# Patient Record
Sex: MALE | Race: WHITE | Employment: OTHER | URBAN - METROPOLITAN AREA
[De-identification: names, ages, dates, MRNs, and addresses within clinical notes are randomized per-mention and may not be internally consistent; named-entity substitution may affect disease eponyms.]

---

## 2017-01-30 ENCOUNTER — HOSPITAL ENCOUNTER (OUTPATIENT)
Dept: GENERAL RADIOLOGY | Age: 67
Discharge: HOME | End: 2017-01-30
Attending: FAMILY MEDICINE
Payer: MEDICARE

## 2017-01-30 DIAGNOSIS — R06.09 DYSPNEA ON EXERTION: ICD-10-CM

## 2017-01-30 PROCEDURE — 71020 XR CHEST PA LAT: CPT

## 2017-01-30 NOTE — PROGRESS NOTES
The chest x ray shows that the heart is a little large likely from the high blood pressure. I will refer to cardiologist for evaluation.  He may need echo and /or stress test.     Alexis Ponce MD

## 2017-02-02 PROBLEM — R06.09 DYSPNEA ON EXERTION: Status: ACTIVE | Noted: 2017-02-02

## 2017-02-02 PROBLEM — E78.2 MIXED HYPERLIPIDEMIA: Status: ACTIVE | Noted: 2017-02-02

## 2017-02-02 PROBLEM — I51.7 CARDIOMEGALY: Status: ACTIVE | Noted: 2017-02-02

## 2017-02-02 PROBLEM — R94.31 ABNORMAL EKG: Status: ACTIVE | Noted: 2017-02-02

## 2017-02-02 PROBLEM — I49.3 VENTRICULAR BEAT, PREMATURE: Status: ACTIVE | Noted: 2017-02-02

## 2017-05-11 ENCOUNTER — HOSPITAL ENCOUNTER (OUTPATIENT)
Dept: MAMMOGRAPHY | Age: 67
Discharge: HOME | End: 2017-05-11
Attending: FAMILY MEDICINE
Payer: MEDICARE

## 2017-05-11 DIAGNOSIS — R22.31 AXILLARY MASS, RIGHT: ICD-10-CM

## 2017-05-11 PROCEDURE — 76882 US LMTD JT/FCL EVL NVASC XTR: CPT

## 2017-05-18 ENCOUNTER — HOSPITAL ENCOUNTER (OUTPATIENT)
Dept: MAMMOGRAPHY | Age: 67
Discharge: HOME | End: 2017-05-18
Attending: FAMILY MEDICINE
Payer: MEDICARE

## 2017-05-18 VITALS — TEMPERATURE: 97.8 F | HEART RATE: 75 BPM | SYSTOLIC BLOOD PRESSURE: 138 MMHG | DIASTOLIC BLOOD PRESSURE: 89 MMHG

## 2017-05-18 DIAGNOSIS — R22.31 MASS OF RIGHT AXILLA: ICD-10-CM

## 2017-05-18 PROCEDURE — 88305 TISSUE EXAM BY PATHOLOGIST: CPT | Performed by: FAMILY MEDICINE

## 2017-05-18 PROCEDURE — 38505 NEEDLE BIOPSY LYMPH NODES: CPT

## 2017-05-18 PROCEDURE — 74011000250 HC RX REV CODE- 250: Performed by: FAMILY MEDICINE

## 2017-05-18 PROCEDURE — 19083 BX BREAST 1ST LESION US IMAG: CPT

## 2017-05-18 RX ORDER — LIDOCAINE HYDROCHLORIDE 10 MG/ML
4 INJECTION INFILTRATION; PERINEURAL
Status: COMPLETED | OUTPATIENT
Start: 2017-05-18 | End: 2017-05-18

## 2017-05-18 RX ADMIN — LIDOCAINE HYDROCHLORIDE 4 ML: 10 INJECTION, SOLUTION INFILTRATION; PERINEURAL at 09:47

## 2017-05-19 NOTE — PROGRESS NOTES
Spoke w/ patient and he voiced understanding. He is unsure of what these lab results mean but he has an appointment w/ you on Monday so he will speak w/ you then.  He states at this moment in time he is not in the mood for surgery

## 2017-05-19 NOTE — PROGRESS NOTES
Biopsy was not conclusive. Apparently sample was not representative of the whole mass. I think he should see surgeon to have it removed. I can set up him up if he gives me the green light.     Chava Nelson MD

## 2017-06-07 ENCOUNTER — HOSPITAL ENCOUNTER (OUTPATIENT)
Dept: CT IMAGING | Age: 67
Discharge: HOME | End: 2017-06-07
Attending: SURGERY
Payer: MEDICARE

## 2017-06-07 DIAGNOSIS — R22.31 AXILLARY MASS, RIGHT: ICD-10-CM

## 2017-06-07 DIAGNOSIS — R22.2 MASS OF CHEST WALL, RIGHT: ICD-10-CM

## 2017-06-07 PROCEDURE — 74011000258 HC RX REV CODE- 258: Performed by: SURGERY

## 2017-06-07 PROCEDURE — 71260 CT THORAX DX C+: CPT

## 2017-06-07 PROCEDURE — 74011636320 HC RX REV CODE- 636/320: Performed by: SURGERY

## 2017-06-07 RX ORDER — SODIUM CHLORIDE 0.9 % (FLUSH) 0.9 %
10 SYRINGE (ML) INJECTION
Status: COMPLETED | OUTPATIENT
Start: 2017-06-07 | End: 2017-06-07

## 2017-06-07 RX ADMIN — Medication 10 ML: at 08:37

## 2017-06-07 RX ADMIN — IOPAMIDOL 80 ML: 755 INJECTION, SOLUTION INTRAVENOUS at 08:36

## 2017-06-07 RX ADMIN — SODIUM CHLORIDE 100 ML: 900 INJECTION, SOLUTION INTRAVENOUS at 08:36

## 2017-06-09 NOTE — PROGRESS NOTES
Kanika Blackwood got a CT because of the size of his axillary mass and it is benign. I called the patient and left a message so he can arrange for removal of his benign lipoma. I also mentioned his hiatal hernia and we could discuss that at a office visit to schedule the lipoma removal.  Thank you for sending him. Navid Oates.

## 2017-07-17 PROBLEM — E66.9 OBESITY (BMI 30-39.9): Status: ACTIVE | Noted: 2017-07-17

## 2017-07-17 PROBLEM — R29.818 SUSPECTED SLEEP APNEA: Status: ACTIVE | Noted: 2017-07-17

## 2017-07-31 ENCOUNTER — HOSPITAL ENCOUNTER (OUTPATIENT)
Dept: SLEEP MEDICINE | Age: 67
Discharge: HOME | End: 2017-07-31
Attending: FAMILY MEDICINE
Payer: MEDICARE

## 2017-07-31 PROCEDURE — 95810 POLYSOM 6/> YRS 4/> PARAM: CPT

## 2017-08-28 ENCOUNTER — HOSPITAL ENCOUNTER (OUTPATIENT)
Dept: SLEEP MEDICINE | Age: 67
Discharge: HOME | End: 2017-08-28
Attending: INTERNAL MEDICINE
Payer: MEDICARE

## 2017-08-28 PROCEDURE — 95811 POLYSOM 6/>YRS CPAP 4/> PARM: CPT

## 2017-11-30 PROBLEM — G47.33 OSA (OBSTRUCTIVE SLEEP APNEA): Status: ACTIVE | Noted: 2017-07-17

## 2018-01-02 PROBLEM — R09.02 HYPOXEMIA: Status: ACTIVE | Noted: 2018-01-02

## 2018-02-01 ENCOUNTER — APPOINTMENT (RX ONLY)
Dept: URBAN - METROPOLITAN AREA CLINIC 349 | Facility: CLINIC | Age: 68
Setting detail: DERMATOLOGY
End: 2018-02-01

## 2018-02-01 DIAGNOSIS — L82.0 INFLAMED SEBORRHEIC KERATOSIS: ICD-10-CM

## 2018-02-01 DIAGNOSIS — L72.0 EPIDERMAL CYST: ICD-10-CM

## 2018-02-01 DIAGNOSIS — D22 MELANOCYTIC NEVI: ICD-10-CM

## 2018-02-01 DIAGNOSIS — L73.8 OTHER SPECIFIED FOLLICULAR DISORDERS: ICD-10-CM

## 2018-02-01 DIAGNOSIS — D18.0 HEMANGIOMA: ICD-10-CM

## 2018-02-01 DIAGNOSIS — B07.8 OTHER VIRAL WARTS: ICD-10-CM

## 2018-02-01 DIAGNOSIS — L82.1 OTHER SEBORRHEIC KERATOSIS: ICD-10-CM

## 2018-02-01 PROBLEM — D23.61 OTHER BENIGN NEOPLASM OF SKIN OF RIGHT UPPER LIMB, INCLUDING SHOULDER: Status: ACTIVE | Noted: 2018-02-01

## 2018-02-01 PROBLEM — D22.61 MELANOCYTIC NEVI OF RIGHT UPPER LIMB, INCLUDING SHOULDER: Status: ACTIVE | Noted: 2018-02-01

## 2018-02-01 PROBLEM — D18.01 HEMANGIOMA OF SKIN AND SUBCUTANEOUS TISSUE: Status: ACTIVE | Noted: 2018-02-01

## 2018-02-01 PROCEDURE — ? LIQUID NITROGEN

## 2018-02-01 PROCEDURE — ? PATHOLOGY BILLING

## 2018-02-01 PROCEDURE — 99202 OFFICE O/P NEW SF 15 MIN: CPT | Mod: 25

## 2018-02-01 PROCEDURE — 17110 DESTRUCTION B9 LES UP TO 14: CPT

## 2018-02-01 PROCEDURE — 88305 TISSUE EXAM BY PATHOLOGIST: CPT

## 2018-02-01 PROCEDURE — ? SHAVE REMOVAL

## 2018-02-01 PROCEDURE — 11306 SHAVE SKIN LESION 0.6-1.0 CM: CPT | Mod: 59

## 2018-02-01 PROCEDURE — ? COUNSELING

## 2018-02-01 PROCEDURE — A4550 SURGICAL TRAYS: HCPCS

## 2018-02-01 ASSESSMENT — LOCATION SIMPLE DESCRIPTION DERM
LOCATION SIMPLE: RIGHT LOWER BACK
LOCATION SIMPLE: LEFT UPPER BACK
LOCATION SIMPLE: LEFT UPPER ARM
LOCATION SIMPLE: LEFT LOWER BACK
LOCATION SIMPLE: RIGHT EYEBROW
LOCATION SIMPLE: ABDOMEN
LOCATION SIMPLE: LEFT SHOULDER
LOCATION SIMPLE: CHEST
LOCATION SIMPLE: LEFT FOREHEAD
LOCATION SIMPLE: RIGHT CHEEK
LOCATION SIMPLE: RIGHT FOREHEAD
LOCATION SIMPLE: RIGHT UPPER ARM
LOCATION SIMPLE: RIGHT SHOULDER
LOCATION SIMPLE: RIGHT UPPER BACK
LOCATION SIMPLE: LEFT CHEEK
LOCATION SIMPLE: RIGHT MIDDLE FINGER

## 2018-02-01 ASSESSMENT — LOCATION DETAILED DESCRIPTION DERM
LOCATION DETAILED: LEFT PROXIMAL POSTERIOR UPPER ARM
LOCATION DETAILED: LEFT INFERIOR LATERAL MIDBACK
LOCATION DETAILED: LEFT LATERAL UPPER BACK
LOCATION DETAILED: RIGHT LATERAL SUPERIOR CHEST
LOCATION DETAILED: RIGHT SUPERIOR MEDIAL MALAR CHEEK
LOCATION DETAILED: LEFT FOREHEAD
LOCATION DETAILED: RIGHT LATERAL ABDOMEN
LOCATION DETAILED: RIGHT ANTERIOR DISTAL UPPER ARM
LOCATION DETAILED: RIGHT INFERIOR LATERAL MIDBACK
LOCATION DETAILED: RIGHT CENTRAL MALAR CHEEK
LOCATION DETAILED: EPIGASTRIC SKIN
LOCATION DETAILED: RIGHT INFERIOR LATERAL FOREHEAD
LOCATION DETAILED: LEFT POSTERIOR SHOULDER
LOCATION DETAILED: LEFT SUPERIOR UPPER BACK
LOCATION DETAILED: RIGHT POSTERIOR SHOULDER
LOCATION DETAILED: RIGHT DISTAL PALMAR MIDDLE FINGER
LOCATION DETAILED: RIGHT INFERIOR MEDIAL FOREHEAD
LOCATION DETAILED: RIGHT SUPERIOR LATERAL BUCCAL CHEEK
LOCATION DETAILED: RIGHT LATERAL EYEBROW
LOCATION DETAILED: LEFT SUPERIOR LATERAL BUCCAL CHEEK
LOCATION DETAILED: RIGHT LATERAL UPPER BACK

## 2018-02-01 ASSESSMENT — LOCATION ZONE DERM
LOCATION ZONE: TRUNK
LOCATION ZONE: ARM
LOCATION ZONE: FACE
LOCATION ZONE: FINGER

## 2018-02-01 NOTE — PROCEDURE: LIQUID NITROGEN
Render Post-Care Instructions In Note?: yes
Number Of Freeze-Thaw Cycles: 2 freeze-thaw cycles
Detail Level: Detailed
Consent: The patient's consent was obtained including but not limited to risks of crusting, scabbing, blistering, scarring, darker or lighter pigmentary change, recurrence, incomplete removal and infection.
Add 52 Modifier (Optional): no
Medical Necessity Clause: This procedure was medically necessary because the lesions that were treated were:
Medical Necessity Information: It is in your best interest to select a reason for this procedure from the list below. All of these items fulfill various CMS LCD requirements except the new and changing color options.
Post-Care Instructions: I reviewed with the patient in detail post-care instructions. Patient is to wear sunprotection, and avoid picking at any of the treated lesions. Pt may apply Vaseline to crusted or scabbing areas.

## 2018-02-01 NOTE — PROCEDURE: SHAVE REMOVAL
X Size Of Lesion In Cm (Optional): 0
Detail Level: Detailed
Wound Care: Vaseline
Medical Necessity Information: It is in your best interest to select a reason for this procedure from the list below. All of these items fulfill various CMS LCD requirements except the new and changing color options.
Hemostasis: Electrocautery
Consent was obtained from the patient. The risks and benefits to therapy were discussed in detail. Specifically, the risks of infection, scarring, bleeding, prolonged wound healing, incomplete removal, allergy to anesthesia, nerve injury and recurrence were addressed. Prior to the procedure, the treatment site was clearly identified and confirmed by the patient. All components of Universal Protocol/PAUSE Rule completed.
Accession #: MD [id number]
Notification Instructions: Patient will be notified of biopsy results. However, patient instructed to call the office if not contacted within 2 weeks.
Billing Type: Third-Party Bill
Size Of Lesion In Cm (Required): 0.8
Render Post-Care Instructions In Note?: yes
Bill [id number] For Specimen Handling/Conveyance To Laboratory?: no
Anesthesia Type: 1% lidocaine with epinephrine
Anesthesia Volume In Cc: 0.3
Post-Care Instructions: I reviewed with the patient in detail post-care instructions. Patient is to keep the biopsy site dry overnight, and then apply bacitracin twice daily until healed. Patient may apply hydrogen peroxide soaks to remove any crusting.  After the procedure, the patient was observed for 5-10 minutes and was oriented to,person, place and time and denied feeling dizzy, queasy and stated that they were not going to faint
Path Notes (To The Dermatopathologist): Please check margins.
Biopsy Method: Dermablade
Medical Necessity Clause: This procedure was medically necessary because the lesion that was treated was:enlarging.

## 2018-08-14 ENCOUNTER — APPOINTMENT (RX ONLY)
Dept: URBAN - METROPOLITAN AREA CLINIC 349 | Facility: CLINIC | Age: 68
Setting detail: DERMATOLOGY
End: 2018-08-14

## 2018-08-14 DIAGNOSIS — L91.8 OTHER HYPERTROPHIC DISORDERS OF THE SKIN: ICD-10-CM

## 2018-08-14 DIAGNOSIS — L82.1 OTHER SEBORRHEIC KERATOSIS: ICD-10-CM

## 2018-08-14 DIAGNOSIS — D18.0 HEMANGIOMA: ICD-10-CM

## 2018-08-14 DIAGNOSIS — D22 MELANOCYTIC NEVI: ICD-10-CM

## 2018-08-14 PROBLEM — D22.5 MELANOCYTIC NEVI OF TRUNK: Status: ACTIVE | Noted: 2018-08-14

## 2018-08-14 PROBLEM — L20.84 INTRINSIC (ALLERGIC) ECZEMA: Status: ACTIVE | Noted: 2018-08-14

## 2018-08-14 PROBLEM — D18.01 HEMANGIOMA OF SKIN AND SUBCUTANEOUS TISSUE: Status: ACTIVE | Noted: 2018-08-14

## 2018-08-14 PROBLEM — L85.3 XEROSIS CUTIS: Status: ACTIVE | Noted: 2018-08-14

## 2018-08-14 PROBLEM — D22.61 MELANOCYTIC NEVI OF RIGHT UPPER LIMB, INCLUDING SHOULDER: Status: ACTIVE | Noted: 2018-08-14

## 2018-08-14 PROCEDURE — 11200 RMVL SKIN TAGS UP TO&INC 15: CPT

## 2018-08-14 PROCEDURE — 99213 OFFICE O/P EST LOW 20 MIN: CPT | Mod: 25

## 2018-08-14 PROCEDURE — ? COUNSELING

## 2018-08-14 PROCEDURE — ? SKIN TAG REMOVAL MULTI

## 2018-08-14 ASSESSMENT — LOCATION SIMPLE DESCRIPTION DERM
LOCATION SIMPLE: LEFT UPPER BACK
LOCATION SIMPLE: LEFT POSTERIOR AXILLA
LOCATION SIMPLE: LEFT ANTERIOR NECK
LOCATION SIMPLE: RIGHT SHOULDER
LOCATION SIMPLE: LEFT AXILLARY VAULT
LOCATION SIMPLE: RIGHT AXILLARY VAULT
LOCATION SIMPLE: RIGHT ANTERIOR NECK
LOCATION SIMPLE: RIGHT CHEEK
LOCATION SIMPLE: CHEST
LOCATION SIMPLE: RIGHT UPPER BACK
LOCATION SIMPLE: LEFT CLAVICULAR SKIN
LOCATION SIMPLE: GROIN
LOCATION SIMPLE: RIGHT FOREARM
LOCATION SIMPLE: RIGHT UPPER ARM
LOCATION SIMPLE: POSTERIOR NECK
LOCATION SIMPLE: ABDOMEN

## 2018-08-14 ASSESSMENT — LOCATION DETAILED DESCRIPTION DERM
LOCATION DETAILED: LEFT POSTERIOR AXILLA
LOCATION DETAILED: LEFT SUPERIOR UPPER BACK
LOCATION DETAILED: RIGHT POSTERIOR NECK
LOCATION DETAILED: LEFT AXILLARY VAULT
LOCATION DETAILED: RIGHT RIB CAGE
LOCATION DETAILED: LEFT INFERIOR UPPER BACK
LOCATION DETAILED: RIGHT VENTRAL PROXIMAL FOREARM
LOCATION DETAILED: LEFT CLAVICULAR SKIN
LOCATION DETAILED: RIGHT CLAVICULAR NECK
LOCATION DETAILED: LEFT INFERIOR LATERAL NECK
LOCATION DETAILED: RIGHT SUPERIOR UPPER BACK
LOCATION DETAILED: RIGHT ANTERIOR SHOULDER
LOCATION DETAILED: RIGHT ANTERIOR PROXIMAL UPPER ARM
LOCATION DETAILED: RIGHT PROXIMAL POSTERIOR UPPER ARM
LOCATION DETAILED: RIGHT LATERAL SUPERIOR CHEST
LOCATION DETAILED: LEFT SUPERIOR ANTERIOR NECK
LOCATION DETAILED: RIGHT CENTRAL MALAR CHEEK
LOCATION DETAILED: LEFT INGUINAL CREASE
LOCATION DETAILED: RIGHT INFERIOR ANTERIOR NECK
LOCATION DETAILED: LEFT CLAVICULAR NECK
LOCATION DETAILED: RIGHT AXILLARY VAULT
LOCATION DETAILED: RIGHT SUPERIOR ANTERIOR NECK
LOCATION DETAILED: RIGHT INFERIOR LATERAL NECK
LOCATION DETAILED: LEFT INFERIOR POSTERIOR NECK
LOCATION DETAILED: LEFT LATERAL SUPERIOR CHEST
LOCATION DETAILED: LEFT SUPERIOR LATERAL UPPER BACK
LOCATION DETAILED: LEFT INFERIOR LATERAL UPPER BACK

## 2018-08-14 ASSESSMENT — LOCATION ZONE DERM
LOCATION ZONE: ARM
LOCATION ZONE: AXILLAE
LOCATION ZONE: TRUNK
LOCATION ZONE: NECK
LOCATION ZONE: FACE

## 2018-08-14 NOTE — PROCEDURE: SKIN TAG REMOVAL MULTI
Consent: Written consent obtained and the risks of skin tag removal was reviewed with the patient including but not limited to bleeding, pigmentary change, infection, pain, and remote possibility of scarring.
Medical Necessity Clause: This procedure was medically necessary because the lesions that were treated were: rubbed by clothing.
Medical Necessity Information: It is in your best interest to select a reason for this procedure from the list below. All of these items fulfill various CMS LCD requirements except the new and changing color options.
Total Number Of Lesions Treated: 1
Detail Level: Detailed
Include Z78.9 (Other Specified Conditions Influencing Health Status) As An Associated Diagnosis?: Yes
Anesthesia Volume In Cc: 0
Anesthesia Type: 2% lidocaine without epinephrine

## 2018-10-23 ENCOUNTER — APPOINTMENT (RX ONLY)
Dept: URBAN - METROPOLITAN AREA CLINIC 349 | Facility: CLINIC | Age: 68
Setting detail: DERMATOLOGY
End: 2018-10-23

## 2018-10-23 DIAGNOSIS — L57.8 OTHER SKIN CHANGES DUE TO CHRONIC EXPOSURE TO NONIONIZING RADIATION: ICD-10-CM

## 2018-10-23 DIAGNOSIS — D18.0 HEMANGIOMA: ICD-10-CM

## 2018-10-23 DIAGNOSIS — L82.0 INFLAMED SEBORRHEIC KERATOSIS: ICD-10-CM

## 2018-10-23 DIAGNOSIS — L72.0 EPIDERMAL CYST: ICD-10-CM

## 2018-10-23 DIAGNOSIS — L82.1 OTHER SEBORRHEIC KERATOSIS: ICD-10-CM

## 2018-10-23 PROBLEM — D18.01 HEMANGIOMA OF SKIN AND SUBCUTANEOUS TISSUE: Status: ACTIVE | Noted: 2018-10-23

## 2018-10-23 PROBLEM — K21.9 GASTRO-ESOPHAGEAL REFLUX DISEASE WITHOUT ESOPHAGITIS: Status: ACTIVE | Noted: 2018-10-23

## 2018-10-23 PROCEDURE — ? LIQUID NITROGEN

## 2018-10-23 PROCEDURE — 99213 OFFICE O/P EST LOW 20 MIN: CPT | Mod: 25

## 2018-10-23 PROCEDURE — 17110 DESTRUCTION B9 LES UP TO 14: CPT

## 2018-10-23 PROCEDURE — ? COUNSELING

## 2018-10-23 ASSESSMENT — LOCATION SIMPLE DESCRIPTION DERM
LOCATION SIMPLE: RIGHT HAND
LOCATION SIMPLE: RIGHT TEMPLE
LOCATION SIMPLE: LEFT FOREARM
LOCATION SIMPLE: ABDOMEN
LOCATION SIMPLE: RIGHT LIP
LOCATION SIMPLE: RIGHT CHEEK
LOCATION SIMPLE: CHEST
LOCATION SIMPLE: RIGHT FOREARM
LOCATION SIMPLE: LEFT HAND
LOCATION SIMPLE: LEFT EYEBROW
LOCATION SIMPLE: INFERIOR FOREHEAD
LOCATION SIMPLE: LEFT CHEEK
LOCATION SIMPLE: LEFT UPPER BACK
LOCATION SIMPLE: LEFT LOWER BACK
LOCATION SIMPLE: RIGHT UPPER BACK

## 2018-10-23 ASSESSMENT — LOCATION ZONE DERM
LOCATION ZONE: HAND
LOCATION ZONE: FACE
LOCATION ZONE: LIP
LOCATION ZONE: ARM
LOCATION ZONE: TRUNK

## 2018-10-23 ASSESSMENT — LOCATION DETAILED DESCRIPTION DERM
LOCATION DETAILED: LEFT SUPERIOR LATERAL UPPER BACK
LOCATION DETAILED: RIGHT LOWER CUTANEOUS LIP
LOCATION DETAILED: INFERIOR MID FOREHEAD
LOCATION DETAILED: LEFT SUPERIOR LATERAL MALAR CHEEK
LOCATION DETAILED: RIGHT CENTRAL MALAR CHEEK
LOCATION DETAILED: STERNUM
LOCATION DETAILED: LEFT LATERAL EYEBROW
LOCATION DETAILED: LEFT INFERIOR MEDIAL UPPER BACK
LOCATION DETAILED: LEFT ULNAR DORSAL HAND
LOCATION DETAILED: LEFT PROXIMAL DORSAL FOREARM
LOCATION DETAILED: LEFT CENTRAL EYEBROW
LOCATION DETAILED: EPIGASTRIC SKIN
LOCATION DETAILED: RIGHT SUPERIOR UPPER BACK
LOCATION DETAILED: LEFT SUPERIOR MEDIAL MIDBACK
LOCATION DETAILED: RIGHT RADIAL DORSAL HAND
LOCATION DETAILED: RIGHT MID TEMPLE
LOCATION DETAILED: LEFT CENTRAL MALAR CHEEK
LOCATION DETAILED: RIGHT PROXIMAL DORSAL FOREARM
LOCATION DETAILED: RIGHT SUPERIOR MEDIAL UPPER BACK

## 2018-10-23 NOTE — PROCEDURE: LIQUID NITROGEN
Medical Necessity Information: It is in your best interest to select a reason for this procedure from the list below. All of these items fulfill various CMS LCD requirements except the new and changing color options.
Medical Necessity Clause: This procedure was medically necessary because the lesions that were treated were:
Include Z78.9 (Other Specified Conditions Influencing Health Status) As An Associated Diagnosis?: Yes
Number Of Freeze-Thaw Cycles: 2 freeze-thaw cycles
Pared With?: 15 blade
Post-Care Instructions: I reviewed with the patient in detail post-care instructions. Patient is to wear sunprotection, and avoid picking at any of the treated lesions. Pt may apply Vaseline to crusted or scabbing areas.
Add 52 Modifier (Optional): no
Consent: The patient's consent was obtained including but not limited to risks of crusting, scabbing, blistering, scarring, darker or lighter pigmentary change, recurrence, incomplete removal and infection.
Detail Level: Detailed

## 2018-12-18 ENCOUNTER — HOSPITAL ENCOUNTER (EMERGENCY)
Age: 68
Discharge: HOME | End: 2018-12-19
Attending: EMERGENCY MEDICINE
Payer: MEDICARE

## 2018-12-18 ENCOUNTER — HOSPITAL ENCOUNTER (OUTPATIENT)
Dept: CT IMAGING | Age: 68
Discharge: HOME | End: 2018-12-18
Attending: NURSE PRACTITIONER
Payer: MEDICARE

## 2018-12-18 ENCOUNTER — ANESTHESIA EVENT (OUTPATIENT)
Dept: SURGERY | Age: 68
End: 2018-12-18
Payer: MEDICARE

## 2018-12-18 ENCOUNTER — ANESTHESIA (OUTPATIENT)
Dept: SURGERY | Age: 68
End: 2018-12-18
Payer: MEDICARE

## 2018-12-18 VITALS — HEIGHT: 67 IN | WEIGHT: 256 LBS | BODY MASS INDEX: 40.18 KG/M2

## 2018-12-18 DIAGNOSIS — K35.80 ACUTE APPENDICITIS, UNSPECIFIED ACUTE APPENDICITIS TYPE: Primary | ICD-10-CM

## 2018-12-18 DIAGNOSIS — R10.31 ABDOMINAL PAIN, RIGHT LOWER QUADRANT: ICD-10-CM

## 2018-12-18 LAB
ALBUMIN SERPL-MCNC: 4.1 G/DL (ref 3.2–4.6)
ALBUMIN/GLOB SERPL: 0.9 {RATIO}
ALP SERPL-CCNC: 60 U/L (ref 50–136)
ALT SERPL-CCNC: 40 U/L (ref 12–65)
ANION GAP SERPL CALC-SCNC: 12 MMOL/L
AST SERPL-CCNC: 65 U/L (ref 15–37)
BASOPHILS # BLD: 0 K/UL (ref 0–0.2)
BASOPHILS NFR BLD: 0 % (ref 0–2)
BILIRUB SERPL-MCNC: 0.8 MG/DL (ref 0.2–1.1)
BUN SERPL-MCNC: 21 MG/DL (ref 8–23)
CALCIUM SERPL-MCNC: 9.4 MG/DL (ref 8.3–10.4)
CHLORIDE SERPL-SCNC: 101 MMOL/L (ref 98–107)
CO2 SERPL-SCNC: 24 MMOL/L (ref 21–32)
CREAT BLD-MCNC: 1.2 MG/DL (ref 0.8–1.5)
CREAT SERPL-MCNC: 1.28 MG/DL (ref 0.8–1.5)
DIFFERENTIAL METHOD BLD: ABNORMAL
EOSINOPHIL # BLD: 0 K/UL (ref 0–0.8)
EOSINOPHIL NFR BLD: 0 % (ref 0.5–7.8)
ERYTHROCYTE [DISTWIDTH] IN BLOOD BY AUTOMATED COUNT: 16.1 % (ref 11.9–14.6)
GLOBULIN SER CALC-MCNC: 4.4 G/DL (ref 2.3–3.5)
GLUCOSE SERPL-MCNC: 105 MG/DL (ref 65–100)
HCT VFR BLD AUTO: 41.8 % (ref 41.1–50.3)
HGB BLD-MCNC: 12.8 G/DL (ref 13.6–17.2)
IMM GRANULOCYTES # BLD: 0.1 K/UL (ref 0–0.5)
IMM GRANULOCYTES NFR BLD AUTO: 0 % (ref 0–5)
LYMPHOCYTES # BLD: 1.7 K/UL (ref 0.5–4.6)
LYMPHOCYTES NFR BLD: 12 % (ref 13–44)
MCH RBC QN AUTO: 24.8 PG (ref 26.1–32.9)
MCHC RBC AUTO-ENTMCNC: 30.6 G/DL (ref 31.4–35)
MCV RBC AUTO: 81 FL (ref 79.6–97.8)
MONOCYTES # BLD: 1.1 K/UL (ref 0.1–1.3)
MONOCYTES NFR BLD: 8 % (ref 4–12)
NEUTS SEG # BLD: 10.9 K/UL (ref 1.7–8.2)
NEUTS SEG NFR BLD: 79 % (ref 43–78)
NRBC # BLD: 0 K/UL (ref 0–0.2)
PLATELET # BLD AUTO: 166 K/UL (ref 150–450)
PMV BLD AUTO: 9.9 FL (ref 9.4–12.3)
POTASSIUM SERPL-SCNC: 3.6 MMOL/L (ref 3.5–5.1)
PROT SERPL-MCNC: 8.5 G/DL
RBC # BLD AUTO: 5.16 M/UL (ref 4.23–5.6)
SODIUM SERPL-SCNC: 137 MMOL/L (ref 136–145)
WBC # BLD AUTO: 13.8 K/UL (ref 4.3–11.1)

## 2018-12-18 PROCEDURE — 74011000250 HC RX REV CODE- 250: Performed by: SURGERY

## 2018-12-18 PROCEDURE — 77030022473 HC HNDL ENDO GIA UNIV USDA -C: Performed by: SURGERY

## 2018-12-18 PROCEDURE — 77030035220 HC TRCR ENDOSC BLNTPRT ANCHR COVD -B: Performed by: SURGERY

## 2018-12-18 PROCEDURE — 74011636320 HC RX REV CODE- 636/320: Performed by: NURSE PRACTITIONER

## 2018-12-18 PROCEDURE — 74011250636 HC RX REV CODE- 250/636: Performed by: SURGERY

## 2018-12-18 PROCEDURE — 77030035045 HC TRCR ENDOSC VRSPRT BLDLSS COVD -B: Performed by: SURGERY

## 2018-12-18 PROCEDURE — 76060000034 HC ANESTHESIA 1.5 TO 2 HR: Performed by: SURGERY

## 2018-12-18 PROCEDURE — 99283 EMERGENCY DEPT VISIT LOW MDM: CPT | Performed by: EMERGENCY MEDICINE

## 2018-12-18 PROCEDURE — 74176 CT ABD & PELVIS W/O CONTRAST: CPT

## 2018-12-18 PROCEDURE — 77030035048 HC TRCR ENDOSC OPTCL COVD -B: Performed by: SURGERY

## 2018-12-18 PROCEDURE — 77030018836 HC SOL IRR NACL ICUM -A: Performed by: SURGERY

## 2018-12-18 PROCEDURE — 76210000016 HC OR PH I REC 1 TO 1.5 HR: Performed by: SURGERY

## 2018-12-18 PROCEDURE — 74011000258 HC RX REV CODE- 258

## 2018-12-18 PROCEDURE — 77030034154 HC SHR COAG HARM ACE J&J -F: Performed by: SURGERY

## 2018-12-18 PROCEDURE — 77030039425 HC BLD LARYNG TRULITE DISP TELE -A: Performed by: ANESTHESIOLOGY

## 2018-12-18 PROCEDURE — 74011250636 HC RX REV CODE- 250/636: Performed by: EMERGENCY MEDICINE

## 2018-12-18 PROCEDURE — 80053 COMPREHEN METABOLIC PANEL: CPT

## 2018-12-18 PROCEDURE — 77030037088 HC TUBE ENDOTRACH ORAL NSL COVD-A: Performed by: ANESTHESIOLOGY

## 2018-12-18 PROCEDURE — 85025 COMPLETE CBC W/AUTO DIFF WBC: CPT

## 2018-12-18 PROCEDURE — 77030022474 HC RELD STPLR ENDO GIA COVD -C: Performed by: SURGERY

## 2018-12-18 PROCEDURE — 74011000250 HC RX REV CODE- 250

## 2018-12-18 PROCEDURE — 77030019908 HC STETH ESOPH SIMS -A: Performed by: ANESTHESIOLOGY

## 2018-12-18 PROCEDURE — 77030034849: Performed by: SURGERY

## 2018-12-18 PROCEDURE — 74011000258 HC RX REV CODE- 258: Performed by: NURSE PRACTITIONER

## 2018-12-18 PROCEDURE — 88304 TISSUE EXAM BY PATHOLOGIST: CPT

## 2018-12-18 PROCEDURE — 77030032490 HC SLV COMPR SCD KNE COVD -B: Performed by: SURGERY

## 2018-12-18 PROCEDURE — 74011250636 HC RX REV CODE- 250/636

## 2018-12-18 PROCEDURE — 74011000258 HC RX REV CODE- 258: Performed by: SURGERY

## 2018-12-18 PROCEDURE — 77030008522 HC TBNG INSUF LAPRO STRY -B: Performed by: SURGERY

## 2018-12-18 PROCEDURE — 76010000153 HC OR TIME 1.5 TO 2 HR: Performed by: SURGERY

## 2018-12-18 PROCEDURE — 82565 ASSAY OF CREATININE: CPT

## 2018-12-18 PROCEDURE — 77030008756 HC TU IRR SUC STRY -B: Performed by: SURGERY

## 2018-12-18 PROCEDURE — 77030035051: Performed by: SURGERY

## 2018-12-18 PROCEDURE — 77030000038 HC TIP SCIS LAPSCP SURI -B: Performed by: SURGERY

## 2018-12-18 PROCEDURE — 96374 THER/PROPH/DIAG INJ IV PUSH: CPT | Performed by: EMERGENCY MEDICINE

## 2018-12-18 PROCEDURE — 77030031139 HC SUT VCRL2 J&J -A: Performed by: SURGERY

## 2018-12-18 RX ORDER — SODIUM CHLORIDE 0.9 % (FLUSH) 0.9 %
10 SYRINGE (ML) INJECTION
Status: COMPLETED | OUTPATIENT
Start: 2018-12-18 | End: 2018-12-18

## 2018-12-18 RX ORDER — EPHEDRINE SULFATE 50 MG/ML
INJECTION, SOLUTION INTRAVENOUS AS NEEDED
Status: DISCONTINUED | OUTPATIENT
Start: 2018-12-18 | End: 2018-12-19 | Stop reason: HOSPADM

## 2018-12-18 RX ORDER — AMOXICILLIN AND CLAVULANATE POTASSIUM 875; 125 MG/1; MG/1
1 TABLET, FILM COATED ORAL 2 TIMES DAILY
Qty: 10 TAB | Refills: 0 | Status: SHIPPED | OUTPATIENT
Start: 2018-12-18 | End: 2018-12-23

## 2018-12-18 RX ORDER — SODIUM CHLORIDE, SODIUM LACTATE, POTASSIUM CHLORIDE, CALCIUM CHLORIDE 600; 310; 30; 20 MG/100ML; MG/100ML; MG/100ML; MG/100ML
100 INJECTION, SOLUTION INTRAVENOUS CONTINUOUS
Status: DISCONTINUED | OUTPATIENT
Start: 2018-12-18 | End: 2018-12-19 | Stop reason: HOSPADM

## 2018-12-18 RX ORDER — ONDANSETRON 2 MG/ML
4 INJECTION INTRAMUSCULAR; INTRAVENOUS
Status: CANCELLED | OUTPATIENT
Start: 2018-12-18 | End: 2018-12-19

## 2018-12-18 RX ORDER — NEOSTIGMINE METHYLSULFATE 1 MG/ML
INJECTION INTRAVENOUS AS NEEDED
Status: DISCONTINUED | OUTPATIENT
Start: 2018-12-18 | End: 2018-12-19 | Stop reason: HOSPADM

## 2018-12-18 RX ORDER — NALOXONE HYDROCHLORIDE 0.4 MG/ML
0.2 INJECTION, SOLUTION INTRAMUSCULAR; INTRAVENOUS; SUBCUTANEOUS
Status: CANCELLED | OUTPATIENT
Start: 2018-12-18

## 2018-12-18 RX ORDER — LIDOCAINE HYDROCHLORIDE 10 MG/ML
0.1 INJECTION INFILTRATION; PERINEURAL AS NEEDED
Status: CANCELLED | OUTPATIENT
Start: 2018-12-18

## 2018-12-18 RX ORDER — ACETAMINOPHEN 10 MG/ML
INJECTION, SOLUTION INTRAVENOUS AS NEEDED
Status: DISCONTINUED | OUTPATIENT
Start: 2018-12-18 | End: 2018-12-19 | Stop reason: HOSPADM

## 2018-12-18 RX ORDER — FENTANYL CITRATE 50 UG/ML
INJECTION, SOLUTION INTRAMUSCULAR; INTRAVENOUS AS NEEDED
Status: DISCONTINUED | OUTPATIENT
Start: 2018-12-18 | End: 2018-12-19 | Stop reason: HOSPADM

## 2018-12-18 RX ORDER — SUCCINYLCHOLINE CHLORIDE 20 MG/ML
INJECTION INTRAMUSCULAR; INTRAVENOUS AS NEEDED
Status: DISCONTINUED | OUTPATIENT
Start: 2018-12-18 | End: 2018-12-19 | Stop reason: HOSPADM

## 2018-12-18 RX ORDER — ADHESIVE BANDAGE
30 BANDAGE TOPICAL
Qty: 1 BOTTLE | Refills: 0 | Status: SHIPPED | OUTPATIENT
Start: 2018-12-18 | End: 2019-02-11

## 2018-12-18 RX ORDER — ONDANSETRON 2 MG/ML
INJECTION INTRAMUSCULAR; INTRAVENOUS AS NEEDED
Status: DISCONTINUED | OUTPATIENT
Start: 2018-12-18 | End: 2018-12-19 | Stop reason: HOSPADM

## 2018-12-18 RX ORDER — SODIUM CHLORIDE, SODIUM LACTATE, POTASSIUM CHLORIDE, CALCIUM CHLORIDE 600; 310; 30; 20 MG/100ML; MG/100ML; MG/100ML; MG/100ML
25 INJECTION, SOLUTION INTRAVENOUS CONTINUOUS
Status: CANCELLED | OUTPATIENT
Start: 2018-12-18 | End: 2018-12-19

## 2018-12-18 RX ORDER — HYDROCODONE BITARTRATE AND ACETAMINOPHEN 7.5; 325 MG/1; MG/1
1 TABLET ORAL
Qty: 25 TAB | Refills: 0 | Status: SHIPPED | OUTPATIENT
Start: 2018-12-18 | End: 2019-02-11

## 2018-12-18 RX ORDER — MIDAZOLAM HYDROCHLORIDE 1 MG/ML
2 INJECTION, SOLUTION INTRAMUSCULAR; INTRAVENOUS ONCE
Status: CANCELLED | OUTPATIENT
Start: 2018-12-18 | End: 2018-12-18

## 2018-12-18 RX ORDER — LIDOCAINE HYDROCHLORIDE 20 MG/ML
INJECTION, SOLUTION EPIDURAL; INFILTRATION; INTRACAUDAL; PERINEURAL AS NEEDED
Status: DISCONTINUED | OUTPATIENT
Start: 2018-12-18 | End: 2018-12-19 | Stop reason: HOSPADM

## 2018-12-18 RX ORDER — FENTANYL CITRATE 50 UG/ML
100 INJECTION, SOLUTION INTRAMUSCULAR; INTRAVENOUS ONCE
Status: CANCELLED | OUTPATIENT
Start: 2018-12-18 | End: 2018-12-18

## 2018-12-18 RX ORDER — BUPIVACAINE HYDROCHLORIDE 7.5 MG/ML
INJECTION, SOLUTION EPIDURAL; RETROBULBAR AS NEEDED
Status: DISCONTINUED | OUTPATIENT
Start: 2018-12-18 | End: 2018-12-19 | Stop reason: HOSPADM

## 2018-12-18 RX ORDER — PROPOFOL 10 MG/ML
INJECTION, EMULSION INTRAVENOUS AS NEEDED
Status: DISCONTINUED | OUTPATIENT
Start: 2018-12-18 | End: 2018-12-19 | Stop reason: HOSPADM

## 2018-12-18 RX ORDER — GLYCOPYRROLATE 0.2 MG/ML
INJECTION INTRAMUSCULAR; INTRAVENOUS AS NEEDED
Status: DISCONTINUED | OUTPATIENT
Start: 2018-12-18 | End: 2018-12-19 | Stop reason: HOSPADM

## 2018-12-18 RX ORDER — MIDAZOLAM HYDROCHLORIDE 1 MG/ML
2 INJECTION, SOLUTION INTRAMUSCULAR; INTRAVENOUS
Status: CANCELLED | OUTPATIENT
Start: 2018-12-18 | End: 2018-12-19

## 2018-12-18 RX ORDER — DEXAMETHASONE SODIUM PHOSPHATE 4 MG/ML
INJECTION, SOLUTION INTRA-ARTICULAR; INTRALESIONAL; INTRAMUSCULAR; INTRAVENOUS; SOFT TISSUE AS NEEDED
Status: DISCONTINUED | OUTPATIENT
Start: 2018-12-18 | End: 2018-12-19 | Stop reason: HOSPADM

## 2018-12-18 RX ORDER — ROCURONIUM BROMIDE 10 MG/ML
INJECTION, SOLUTION INTRAVENOUS AS NEEDED
Status: DISCONTINUED | OUTPATIENT
Start: 2018-12-18 | End: 2018-12-19 | Stop reason: HOSPADM

## 2018-12-18 RX ADMIN — NEOSTIGMINE METHYLSULFATE 5 MG: 1 INJECTION INTRAVENOUS at 23:47

## 2018-12-18 RX ADMIN — FENTANYL CITRATE 100 MCG: 50 INJECTION, SOLUTION INTRAMUSCULAR; INTRAVENOUS at 22:37

## 2018-12-18 RX ADMIN — SODIUM CHLORIDE 100 ML: 900 INJECTION, SOLUTION INTRAVENOUS at 19:00

## 2018-12-18 RX ADMIN — ROCURONIUM BROMIDE 10 MG: 10 INJECTION, SOLUTION INTRAVENOUS at 22:57

## 2018-12-18 RX ADMIN — DEXAMETHASONE SODIUM PHOSPHATE 10 MG: 4 INJECTION, SOLUTION INTRA-ARTICULAR; INTRALESIONAL; INTRAMUSCULAR; INTRAVENOUS; SOFT TISSUE at 22:59

## 2018-12-18 RX ADMIN — PIPERACILLIN SODIUM,TAZOBACTAM SODIUM 4.5 G: 4; .5 INJECTION, POWDER, FOR SOLUTION INTRAVENOUS at 22:28

## 2018-12-18 RX ADMIN — SODIUM CHLORIDE, SODIUM LACTATE, POTASSIUM CHLORIDE, AND CALCIUM CHLORIDE: 600; 310; 30; 20 INJECTION, SOLUTION INTRAVENOUS at 22:53

## 2018-12-18 RX ADMIN — SUCCINYLCHOLINE CHLORIDE 180 MG: 20 INJECTION INTRAMUSCULAR; INTRAVENOUS at 22:37

## 2018-12-18 RX ADMIN — ROCURONIUM BROMIDE 30 MG: 10 INJECTION, SOLUTION INTRAVENOUS at 22:40

## 2018-12-18 RX ADMIN — EPHEDRINE SULFATE 10 MG: 50 INJECTION, SOLUTION INTRAVENOUS at 22:57

## 2018-12-18 RX ADMIN — ROCURONIUM BROMIDE 10 MG: 10 INJECTION, SOLUTION INTRAVENOUS at 23:11

## 2018-12-18 RX ADMIN — ACETAMINOPHEN 1000 MG: 10 INJECTION, SOLUTION INTRAVENOUS at 22:54

## 2018-12-18 RX ADMIN — SODIUM CHLORIDE, SODIUM LACTATE, POTASSIUM CHLORIDE, AND CALCIUM CHLORIDE 100 ML/HR: 600; 310; 30; 20 INJECTION, SOLUTION INTRAVENOUS at 21:24

## 2018-12-18 RX ADMIN — Medication 10 ML: at 19:00

## 2018-12-18 RX ADMIN — EPHEDRINE SULFATE 10 MG: 50 INJECTION, SOLUTION INTRAVENOUS at 22:48

## 2018-12-18 RX ADMIN — PROPOFOL 20 MG: 10 INJECTION, EMULSION INTRAVENOUS at 22:57

## 2018-12-18 RX ADMIN — ROCURONIUM BROMIDE 10 MG: 10 INJECTION, SOLUTION INTRAVENOUS at 22:37

## 2018-12-18 RX ADMIN — IOPAMIDOL 100 ML: 755 INJECTION, SOLUTION INTRAVENOUS at 19:00

## 2018-12-18 RX ADMIN — GLYCOPYRROLATE 0.4 MG: 0.2 INJECTION INTRAMUSCULAR; INTRAVENOUS at 23:47

## 2018-12-18 RX ADMIN — DIATRIZOATE MEGLUMINE AND DIATRIZOATE SODIUM 15 ML: 600; 100 SOLUTION ORAL; RECTAL at 19:00

## 2018-12-18 RX ADMIN — LIDOCAINE HYDROCHLORIDE 60 MG: 20 INJECTION, SOLUTION EPIDURAL; INFILTRATION; INTRACAUDAL; PERINEURAL at 22:37

## 2018-12-18 RX ADMIN — ONDANSETRON 4 MG: 2 INJECTION INTRAMUSCULAR; INTRAVENOUS at 23:38

## 2018-12-19 VITALS
WEIGHT: 255 LBS | DIASTOLIC BLOOD PRESSURE: 78 MMHG | HEIGHT: 68 IN | BODY MASS INDEX: 38.65 KG/M2 | OXYGEN SATURATION: 91 % | SYSTOLIC BLOOD PRESSURE: 133 MMHG | HEART RATE: 100 BPM | TEMPERATURE: 97.2 F | RESPIRATION RATE: 16 BRPM

## 2018-12-19 RX ORDER — HYDROMORPHONE HYDROCHLORIDE 2 MG/ML
0.5 INJECTION, SOLUTION INTRAMUSCULAR; INTRAVENOUS; SUBCUTANEOUS
Status: DISCONTINUED | OUTPATIENT
Start: 2018-12-19 | End: 2018-12-19 | Stop reason: HOSPADM

## 2018-12-19 RX ORDER — OXYCODONE HYDROCHLORIDE 5 MG/1
5 TABLET ORAL
Status: DISCONTINUED | OUTPATIENT
Start: 2018-12-19 | End: 2018-12-19 | Stop reason: HOSPADM

## 2018-12-19 RX ORDER — SODIUM CHLORIDE, SODIUM LACTATE, POTASSIUM CHLORIDE, CALCIUM CHLORIDE 600; 310; 30; 20 MG/100ML; MG/100ML; MG/100ML; MG/100ML
75 INJECTION, SOLUTION INTRAVENOUS CONTINUOUS
Status: DISCONTINUED | OUTPATIENT
Start: 2018-12-19 | End: 2018-12-19 | Stop reason: HOSPADM

## 2018-12-19 RX ORDER — NALOXONE HYDROCHLORIDE 0.4 MG/ML
0.2 INJECTION, SOLUTION INTRAMUSCULAR; INTRAVENOUS; SUBCUTANEOUS AS NEEDED
Status: DISCONTINUED | OUTPATIENT
Start: 2018-12-19 | End: 2018-12-19 | Stop reason: HOSPADM

## 2018-12-19 NOTE — H&P
H&P/Consult Note/Progress Note/Office Note:   Mayda Cheema. MRN: [id number]  :1950  Age:68 y.o.    HPI: Thaddeus Agee. is a 76 y.o. male who developed with right lower abdominal pain over last 4-5 days, which was progressively worse. He denies nausea, vomiting. No diarrhea. No fever. CT scan in ER showed acute appendicitis with quite a bit inflammation around it. He is morbidly obese. Past Medical History:   Diagnosis Date    Burning with urination     Headache     Heart attack [facility])     minor - 2008  Dr Otto Johnson Hypercholesterolemia     Hypertension     MI, old    [facility] Morbid obesity ([address]      Past Surgical History:   Procedure Laterality Date    CARDIAC SURG PROCEDURE UNLIST      ht cath    HX CHOLECYSTECTOMY      polyps    HX COLONOSCOPY      about  Dr. Arlin Lomeli      Dr. Reva Banerjee HX SKIN BIOPSY  2017     Current Facility-Administered Medications   Medication Dose Route Frequency    lactated Ringers infusion  100 mL/hr IntraVENous CONTINUOUS     Current Outpatient Medications   Medication Sig    OTHER     tiZANidine (ZANAFLEX) 2 mg tablet Take 1 Tab by mouth two (2) times daily (with meals).  atorvastatin (LIPITOR) 10 mg tablet Take 1 Tab by mouth daily.  metoprolol succinate (TOPROL-XL) 100 mg tablet Take 1 Tab by mouth daily.  fluticasone (FLONASE) 50 mcg/actuation nasal spray 2 Sprays by Both Nostrils route daily.  losartan (COZAAR) 25 mg tablet Take 1 Tab by mouth daily. Indications: hypertension    pantoprazole (PROTONIX) 40 mg tablet Take 1 Tab by mouth daily.  magnesium oxide (MAG-OX) 400 mg tablet Take 1 Tab by mouth daily.  aspirin delayed-release 81 mg tablet Take 81 mg by mouth daily.  nitroglycerin (NITROSTAT) 0.4 mg SL tablet 1 Tab by SubLINGual route every five (5) minutes as needed for Chest Pain.  Indications: ANGINA    vitamin E (AQUA GEMS) 400 unit capsule Take  by mouth daily.    omega 3-dha-epa-fish oil 900-1,400 mg cpDR Take  by mouth.  varicella-zoster recombinant, PF, (SHINGRIX, PF,) 50 mcg/0.5 mL susr injection 0.5mL by IntraMUSCular route once now and then repeat in 2-6 months    Potassium Gluconate 595 mg (99 mg) tablet Take  by mouth daily. Patient has no known allergies. Social History     Socioeconomic History    Marital status:      Spouse name: Not on file    Number of children: Not on file    Years of education: Not on file    Highest education level: Not on file   Tobacco Use    Smoking status: Former Smoker     Last attempt to quit: 2007     Years since quittin.0    Smokeless tobacco: Never Used   Substance and Sexual Activity    Alcohol use: Yes     Comment: occ     Drug use: No     Social History     Tobacco Use   Smoking Status Former Smoker    Last attempt to quit: 2007    Years since quittin.0   Smokeless Tobacco Never Used     Family History   Problem Relation Age of Onset    Alzheimer Mother     Heart Disease Father      ROS: The patient has no difficulty with chest pain or shortness of breath. No fever or chills. Comprehensive review of systems was otherwise unremarkable except as noted above. Physical Exam:   Visit Vitals  /74   Pulse 99   Temp 98.2 °F (36.8 °C)   Resp 18   Ht 5' 7.5\" (1.715 m)   Wt 255 lb (115.7 kg)   SpO2 95%   BMI 39.35 kg/m²     Vitals:    18 2026 18 2100 18 2200   BP: 109/72 112/73 114/74   Pulse: 99     Resp: 18     Temp: 98.2 °F (36.8 °C)     SpO2: 94% 94% 95%   Weight: 255 lb (115.7 kg)     Height: 5' 7.5\" (1.715 m)       No intake/output data recorded. No intake/output data recorded. Constitutional: Alert, oriented, cooperative patient in no acute distress; appears stated age    Eyes:Sclera are clear. EOMs intact  ENMT: no external lesions gross hearing normal; no obvious neck masses, no ear or lip lesions, nares normal  CV: RRR.  Normal perfusion  Resp: No JVD. Breathing is  non-labored; no audible wheezing. GI: soft, moderately distended. Tender at right lower quadrant with some rebound. Musculoskeletal: unremarkable with normal function. No embolic signs or cyanosis. Neuro:  Oriented; moves all 4; no focal deficits  Psychiatric: normal affect and mood, no memory impairment    Recent vitals (if inpt):  Patient Vitals for the past 24 hrs:   BP Temp Pulse Resp SpO2 Height Weight   12/18/18 2200 114/74    95 %     12/18/18 2100 112/73    94 %     12/18/18 2026 109/72 98.2 °F (36.8 °C) 99 18 94 % 5' 7.5\" (1.715 m) 255 lb (115.7 kg)       Labs:  Recent Labs     12/18/18 2104   WBC 13.8*   HGB 12.8*         K 3.6      CO2 24   BUN 21   CREA 1.28   *   TBILI 0.8   SGOT 65*   ALT 40   AP 60       Lab Results   Component Value Date/Time    WBC 13.8 (H) 12/18/2018 09:04 PM    HGB 12.8 (L) 12/18/2018 09:04 PM    PLATELET 790 28/66/1206 09:04 PM    Sodium 137 12/18/2018 09:04 PM    Potassium 3.6 12/18/2018 09:04 PM    Chloride 101 12/18/2018 09:04 PM    CO2 24 12/18/2018 09:04 PM    BUN 21 12/18/2018 09:04 PM    Creatinine 1.28 12/18/2018 09:04 PM    Glucose 105 (H) 12/18/2018 09:04 PM    INR 1.0 05/15/2017 12:22 PM    aPTT 28 05/15/2017 12:22 PM    Bilirubin, total 0.8 12/18/2018 09:04 PM    Bilirubin, direct 0.10 04/19/2016 11:15 AM    AST (SGOT) 65 (H) 12/18/2018 09:04 PM    ALT (SGPT) 40 12/18/2018 09:04 PM    Alk. phosphatase 60 12/18/2018 09:04 PM         I reviewed recent labs, recent radiologic studies, and pertinent records including other doctor notes if needed. I independently reviewed radiology images for studies I described above or studies I have ordered.    Admission date (for inpatients): 12/18/2018   Day of Surgery  Procedure(s):  APPENDECTOMY LAPAROSCOPIC    CT  CT Results (most recent):  Results from [place] encounter on 12/18/18   CT ABD PELV WO CONT    Narrative EXAM: CT of the abdomen and pelvis without contrast.  ADDITIONAL CLINICAL INFORMATION: Right lower quadrant pain with concern for  acute appendicitis. Technologist unable to acquire a working IV line. Comparison: None. Multiple axial images were obtained through the abdomen and pelvis without IV  contrast.  Radiation dose reduction techniques were used for this study: All CT  scans performed at this facility use one or all of the following: Automated  exposure control, adjustment of the mA and/or kVp according to patient's size,  iterative reconstruction. FINDINGS:  LOWER THORAX: Large gastric herniation involving most of the stomach seen within  the lower thorax. LIVER: Normal size and contour. No focal liver lesions. BILIARY SYSTEM: The gallbladder is normal. No biliary duct dilation. SPLEEN: No splenomegaly or suspicious splenic lesion. PANCREAS: No pancreatic mass. No pancreatic duct dilation. ADRENALS: No adrenal nodule or adrenal hypertrophy. URINARY SYSTEM: There is contrast seen within the collecting systems of the  bilateral kidneys and ureters. No evidence of obstructing mass of the ureters. REPRODUCTIVE ORGANS: Unremarkable. BOWEL: Sigmoid and descending colonic diverticulosis without evidence of  diverticulitis. The appendix is inflamed and distended with periappendiceal  mesenteric fat stranding without evidence of free air or fluid collection to  suggest perforation. There is wall thickening and inflammatory change about the  terminal ileum which is favored all reactive to the appendiceal inflammatory  change. No evidence of bowel obstruction. Gastric herniation as described above. VASCULAR/NODES: Advanced fibrofatty and calcific atherosclerosis of the  infrarenal abdominal aorta without evidence of abdominal aortic aneurysm. No  pathologic adenopathy of the abdomen or pelvis.  There are prominent likely  reactive right lower quadrant mesenteric lymph nodes seen.    BONES/SUBCUTANEOUS TISSUE: No aggressive osseous lesion. No subcutaneous soft  tissue abnormality. Impression IMPRESSION:  1. Acute uncomplicated appendicitis. 2. Inflammatory change of the distal ileum which appears reactive to the  periappendiceal inflammation. 3. Large gastric herniation into the lower thorax. ASSESSMENT/PLAN:  Problem List  Date Reviewed: 12/18/2018          Codes Class Noted    Hypoxemia ICD-10-CM: R09.02  ICD-9-CM: 799.02  1/2/2018        ANNABELLE (obstructive sleep apnea) ICD-10-CM: P86.97  ICD-9-CM: 327.23  7/17/2017        Obesity (BMI 30-39. 9) ICD-10-CM: E66.9  ICD-9-CM: 278.00  7/17/2017        Ventricular beat, premature ICD-10-CM: I49.3  ICD-9-CM: 427.69  2/2/2017        Cardiomegaly ICD-10-CM: I51.7  ICD-9-CM: 429.3  2/2/2017        Mixed hyperlipidemia ICD-10-CM: E78.2  ICD-9-CM: 272.2  2/2/2017        Dyspnea on exertion ICD-10-CM: R06.09  ICD-9-CM: 786.09  2/2/2017        Abnormal EKG ICD-10-CM: R94.31  ICD-9-CM: 794.31  2/2/2017        Essential hypertension (Chronic) ICD-10-CM: I10  ICD-9-CM: 401.9  3/24/2016        Iron deficiency anemia ICD-10-CM: D50.9  ICD-9-CM: 280.9  3/24/2016        CAD (coronary artery disease) (Chronic) ICD-10-CM: I25.10  ICD-9-CM: 414.00  3/24/2016            Active Problems:    * No active hospital problems. *     I have independently seen the patient and obtained history and independently examined the patient. I have also reviewed data/labs and developed the above plan of care. Acute appendicitis with phlegmon. Possible perforated. Lap appy, possible open tonight.        Signed:  Marsha Brown MD,  FACS

## 2018-12-19 NOTE — ANESTHESIA POSTPROCEDURE EVALUATION
Procedure(s):  LAPAROSCOPIC APPENDECTOMY. Anesthesia Post Evaluation      Multimodal analgesia: multimodal analgesia used between 6 hours prior to anesthesia start to PACU discharge  Patient location during evaluation: bedside  Patient participation: complete - patient participated  Level of consciousness: awake and alert  Pain score: 1  Pain management: adequate  Airway patency: patent  Anesthetic complications: no  Cardiovascular status: acceptable  Respiratory status: acceptable  Hydration status: acceptable  Comments: Pt doing well. Ok to d/c home. Encouraged to wear CPAP even during naps. Talking and interacting with his wife. Ready for discharge. Saturation >90% on RA.    Post anesthesia nausea and vomiting:  none      Visit Vitals  /71   Pulse 97   Temp 36.2 °C (97.2 °F)   Resp 16   Ht 5' 7.5\" (1.715 m)   Wt 115.7 kg (255 lb)   SpO2 92%   BMI 39.35 kg/m²

## 2018-12-19 NOTE — ANESTHESIA PREPROCEDURE EVALUATION
Anesthetic History   No history of anesthetic complications            Review of Systems / Medical History  Patient summary reviewed and pertinent labs reviewed    Pulmonary        Sleep apnea: CPAP           Neuro/Psych   Within defined limits           Cardiovascular    Hypertension: well controlled          Past MI (20 years ago) and CAD    Exercise tolerance: >4 METS  Comments: Nml stress test 12/2017  Nml ECHO 2017 with slightly reduced EF 50%  Denies CP, SOB or changes in functional status   GI/Hepatic/Renal     GERD: well controlled           Endo/Other        Morbid obesity     Other Findings              Physical Exam    Airway  Mallampati: II  TM Distance: 4 - 6 cm  Neck ROM: normal range of motion   Mouth opening: Normal     Cardiovascular    Rhythm: regular  Rate: normal         Dental    Dentition: Edentulous, Full lower dentures and Full upper dentures     Pulmonary  Breath sounds clear to auscultation               Abdominal  GI exam deferred       Other Findings            Anesthetic Plan    ASA: 3, emergent  Anesthesia type: general          Induction: Intravenous and RSI  Anesthetic plan and risks discussed with: Patient

## 2018-12-19 NOTE — BRIEF OP NOTE
BRIEF OPERATIVE NOTE    Date of Procedure: 12/18/2018   Preoperative Diagnosis: acute appendix  Postoperative Diagnosis: acute appendix    Procedure(s):  LAPAROSCOPIC APPENDECTOMY  Surgeon(s) and Role:     Tamara Carrero MD - Primary         Surgical Assistant: none    Surgical Staff:  Circ-1: Ciaran Moura  Scrub Tech-1: Yuriy Villa  Scrub Tech-2: Christina Maharaj  Event Time In Time Out   Incision Start 10:58 PM    Incision Close 11:49 PM      Anesthesia: General   Estimated Blood Loss: less than 15 ml  Specimens:   ID Type Source Tests Collected by Time Destination   1 : Appendix Preservative Appendix  Soham Garcia MD 12/18/2018 11:32 PM Pathology      Findings: acute gangrenous appendicitis  Complications: none  Implants: * No implants in log *

## 2018-12-19 NOTE — ED PROVIDER NOTES
Patient presents with three-day history of suprapubic/right lower quadrant abdominal discomfort, seen by his primary care physician today and sent for CT the abdomen and pelvis, which revealed acute appendicitis. Sent to the emergency department for further evaluation. The history is provided by the patient and the spouse. Abdominal Pain    This is a new problem. The current episode started more than 2 days ago. The problem occurs constantly. The problem has been gradually worsening. The pain is associated with an unknown factor. The pain is located in the suprapubic region and RLQ. The quality of the pain is aching and sharp. The pain is at a severity of 4/10. Associated symptoms include nausea (intermittent) and constipation. Pertinent negatives include no anorexia, no fever, no belching, no diarrhea, no flatus, no hematochezia, no melena, no vomiting, no dysuria, no frequency, no hematuria, no headaches, no arthralgias, no myalgias, no trauma, no chest pain, no testicular pain and no back pain. The pain is worsened by activity and palpation. The pain is relieved by nothing. Past workup includes CT scan (earlier this evening, positive for appendicitis). His past medical history is significant for GERD. His past medical history does not include PUD, gallstones, ulcerative colitis, Crohn's disease, irritable bowel syndrome, cancer, UTI, pancreatitis, diverticulitis, atrial fibrillation, DM, kidney stones or small bowel obstruction. The patient's surgical history non-contributory.        Past Medical History:   Diagnosis Date    Burning with urination     Headache     Heart attack [facility])     minor - 2008  Dr Jose L Galvan Hypercholesterolemia     Hypertension     MI, old     Morbid obesity ([facility] [address]        Past Surgical History:   Procedure Laterality Date    CARDIAC SURG PROCEDURE UNLIST      ht cath    HX CHOLECYSTECTOMY  2014    polyps    HX COLONOSCOPY      about 2015 Dr. Pelon Rodriguez  2015 Dr. Hina Ascencio HX SKIN BIOPSY  2017         Family History:   Problem Relation Age of Onset    Alzheimer Mother     Heart Disease Father        Social History     Socioeconomic History    Marital status:      Spouse name: Not on file    Number of children: Not on file    Years of education: Not on file    Highest education level: Not on file   Social Needs    Financial resource strain: Not on file    Food insecurity - worry: Not on file    Food insecurity - inability: Not on file   [place] needs - medical: Not on file   [place] needs - non-medical: Not on file   Occupational History    Not on file   Tobacco Use    Smoking status: Former Smoker     Last attempt to quit: 2007     Years since quittin.0    Smokeless tobacco: Never Used   Substance and Sexual Activity    Alcohol use: Yes     Comment: occ     Drug use: No    Sexual activity: Not on file   Other Topics Concern    Not on file   Social History Narrative    Not on file         ALLERGIES: Patient has no known allergies. Review of Systems   Constitutional: Positive for activity change and appetite change. Negative for chills and fever. Cardiovascular: Negative for chest pain. Gastrointestinal: Positive for abdominal pain, constipation and nausea (intermittent). Negative for anorexia, diarrhea, flatus, hematochezia, melena and vomiting. Genitourinary: Negative for dysuria, frequency, hematuria and testicular pain. Musculoskeletal: Negative for arthralgias, back pain and myalgias. Neurological: Negative for headaches. All other systems reviewed and are negative. Vitals:    18   BP: 109/72   Pulse: 99   Resp: 18   Temp: 98.2 °F (36.8 °C)   SpO2: 94%   Weight: 115.7 kg (255 lb)   Height: 5' 7.5\" (1.715 m)            Physical Exam   Constitutional: He is oriented to person, place, and time. He appears well-developed and well-nourished.  He appears distressed (mild). HENT:   Head: Normocephalic and atraumatic. Right Ear: External ear normal.   Left Ear: External ear normal.   Mouth/Throat: Oropharynx is clear and moist.   Eyes: Conjunctivae and EOM are normal. Pupils are equal, round, and reactive to light. Neck: Normal range of motion. Neck supple. Cardiovascular: Normal rate, regular rhythm, normal heart sounds and intact distal pulses. Pulmonary/Chest: Effort normal and breath sounds normal.   Abdominal: Soft. Bowel sounds are normal. He exhibits distension. He exhibits no shifting dullness, no pulsatile liver, no fluid wave, no abdominal bruit, no ascites, no pulsatile midline mass and no mass. There is no hepatosplenomegaly. There is tenderness in the right lower quadrant, suprapubic area and left lower quadrant. There is rebound (mild localized) and tenderness at McBurney's point. There is no rigidity, no guarding, no CVA tenderness and negative Fox's sign. Musculoskeletal: Normal range of motion. He exhibits no edema. Neurological: He is alert and oriented to person, place, and time. Skin: Skin is warm and dry. Capillary refill takes less than 2 seconds. Psychiatric: He has a normal mood and affect. Nursing note and vitals reviewed.        MDM  Number of Diagnoses or Management Options  Acute appendicitis, unspecified acute appendicitis type: new and requires workup     Amount and/or Complexity of Data Reviewed  Clinical lab tests: ordered and reviewed  Tests in the radiology section of CPT®: reviewed  Review and summarize past medical records: yes  Discuss the patient with other providers: yes  Independent visualization of images, tracings, or specimens: yes    Risk of Complications, Morbidity, and/or Mortality  Presenting problems: high  Diagnostic procedures: high  Management options: high    Patient Progress  Patient progress: stable         Procedures

## 2018-12-19 NOTE — ED NOTES
All consents signed. Surgical team arrived.  Report and all paperwork given,the patient taken in nad

## 2018-12-19 NOTE — DISCHARGE INSTRUCTIONS
Physician Specific Discharge Instructions:    - Remove umbilical plastic dressing and gauze after 48 hours, leave sterile     strips on for 7-10 days, OK to shower. - Augmentin for 5 days.

## 2018-12-19 NOTE — OP NOTES
[address] [place] [address] REPORT    Marcelino Cristina  MR#: [id number]  : 1950  ACCOUNT #: [de-identified]   DATE OF SERVICE: 2018    PREOPERATIVE DIAGNOSIS:  Acute appendicitis. POSTOPERATIVE DIAGNOSIS:  Acute appendicitis. PROCEDURE PERFORMED:  Laparoscopic appendectomy. SURGEON:  Trace Baca MD    ANESTHESIA: general     ESTIMATED BLOOD LOSS:  15 ml    SPECIMENS REMOVED: as above    COMPLICATIONS:  none    INDICATIONS:  This is a 22-year-old gentleman who presented to the emergency room with acute onset lower abdominal pain for almost 5 days and his symptoms worse. He presented to the emergency room with a CT scan confirmed acute appendicitis with quite a bit of inflammation around it and surgery was immediately offered to him. He understood risks and benefits and agreed to proceed. FINDINGS:  He does have acute gangrenous appendicitis surrounded by a terminal ileum and the cecum. There is no abscess. PROCEDURE:  After informed consent obtained, the patient brought into the operating room, laid in supine position. General anesthesia was administered. The patient was then prepped and draped in usual routine fashion. The infraumbilical incision was made, Hany cannula was inserted. Pneumoperitoneum created. Two 5 mm trocars placed in the suprapubic area in the left lower quadrant and the peritoneal cavity was inspected. He has a quite distended small and large intestine and the peritoneal space was limited although he is morbidly obese. I was able to see the right lower quadrant. This was surrounded by terminal ileum and the cecum. After the bowel was carefully pushed away and acute gangrenous appendicitis was noticed, and this inflammation almost extended to the root; however, at the very base of the appendix appeared to be healthy.   With careful dissection, we were able to surround the base of the appendix and then the Endo GI stapler was attempted through the umbilical port. However, due to his body habitus and significant inflammation, had to use the left lower quadrant port. This was switched to a 12 mm and then the stapler was able to pass and the base of the appendix was divided with a stapler. Then, the mesoappendix was divided with a Harmonic and the specimen was placed in the Endobag and retrieved from the peritoneal cavity:  Surgically inspected, moderate amount of irrigation was used; return fluid clear. No evidence of bleeding or bowel injury at the end of the case. Then all the trocars were removed. The infraumbilical incision was closed on the fascia with 0 Vicryl stitch. All skin incisions closed with 4-0 Vicryl stitch in subcuticular fashion. The patient tolerated the procedure well and was transferred to recovery room in stable condition. All the instrument count and lap count were correct. Estimated blood loss was about 15 mL.       Silvestre Fox MD       BY / TN  D: 12/19/2018 00:13     T: 12/19/2018 10:20  JOB #: [id number]

## 2019-01-05 PROBLEM — R22.31 AXILLARY MASS, RIGHT: Status: ACTIVE | Noted: 2019-01-05

## 2019-02-11 PROBLEM — I25.2 HISTORY OF MI (MYOCARDIAL INFARCTION): Status: ACTIVE | Noted: 2019-02-11

## 2019-02-11 PROBLEM — G47.33 OSA (OBSTRUCTIVE SLEEP APNEA): Status: RESOLVED | Noted: 2017-07-17 | Resolved: 2019-02-11

## 2019-02-11 PROBLEM — E66.01 SEVERE OBESITY (HCC): Status: ACTIVE | Noted: 2019-02-11

## 2019-02-11 PROBLEM — Z99.89 OSA ON CPAP: Status: ACTIVE | Noted: 2017-07-17

## 2019-02-13 RX ORDER — SODIUM CHLORIDE 0.9 % (FLUSH) 0.9 %
5-40 SYRINGE (ML) INJECTION AS NEEDED
Status: CANCELLED | OUTPATIENT
Start: 2019-02-13

## 2019-02-13 RX ORDER — SODIUM CHLORIDE 0.9 % (FLUSH) 0.9 %
5-40 SYRINGE (ML) INJECTION EVERY 8 HOURS
Status: CANCELLED | OUTPATIENT
Start: 2019-02-13

## 2019-02-21 ENCOUNTER — HOSPITAL ENCOUNTER (OUTPATIENT)
Dept: SURGERY | Age: 69
Discharge: HOME | End: 2019-02-21
Payer: MEDICARE

## 2019-02-21 ENCOUNTER — HOSPITAL ENCOUNTER (OUTPATIENT)
Dept: GENERAL RADIOLOGY | Age: 69
Discharge: HOME | End: 2019-02-21
Attending: SURGERY
Payer: MEDICARE

## 2019-02-21 VITALS
BODY MASS INDEX: 41.23 KG/M2 | DIASTOLIC BLOOD PRESSURE: 90 MMHG | HEIGHT: 67 IN | HEART RATE: 63 BPM | OXYGEN SATURATION: 94 % | WEIGHT: 262.7 LBS | TEMPERATURE: 98.5 F | SYSTOLIC BLOOD PRESSURE: 149 MMHG | RESPIRATION RATE: 17 BRPM

## 2019-02-21 LAB
ALBUMIN SERPL-MCNC: 4 G/DL (ref 3.2–4.6)
ALBUMIN/GLOB SERPL: 1 {RATIO} (ref 1.2–3.5)
ALP SERPL-CCNC: 76 U/L (ref 50–136)
ALT SERPL-CCNC: 61 U/L (ref 12–65)
ANION GAP SERPL CALC-SCNC: 9 MMOL/L (ref 7–16)
APPEARANCE UR: CLEAR
AST SERPL-CCNC: 96 U/L (ref 15–37)
ATRIAL RATE: 59 BPM
BASOPHILS # BLD: 0 K/UL (ref 0–0.2)
BASOPHILS NFR BLD: 1 % (ref 0–2)
BILIRUB SERPL-MCNC: 0.4 MG/DL (ref 0.2–1.1)
BILIRUB UR QL: NEGATIVE
BUN SERPL-MCNC: 17 MG/DL (ref 8–23)
CALCIUM SERPL-MCNC: 9.3 MG/DL (ref 8.3–10.4)
CALCULATED P AXIS, ECG09: 53 DEGREES
CALCULATED R AXIS, ECG10: -25 DEGREES
CALCULATED T AXIS, ECG11: -5 DEGREES
CHLORIDE SERPL-SCNC: 107 MMOL/L (ref 98–107)
CO2 SERPL-SCNC: 26 MMOL/L (ref 21–32)
COLOR UR: YELLOW
CREAT SERPL-MCNC: 1.05 MG/DL (ref 0.8–1.5)
DIAGNOSIS, 93000: NORMAL
DIFFERENTIAL METHOD BLD: ABNORMAL
EOSINOPHIL # BLD: 0.2 K/UL (ref 0–0.8)
EOSINOPHIL NFR BLD: 3 % (ref 0.5–7.8)
ERYTHROCYTE [DISTWIDTH] IN BLOOD BY AUTOMATED COUNT: 16.2 % (ref 11.9–14.6)
GLOBULIN SER CALC-MCNC: 4.2 G/DL (ref 2.3–3.5)
GLUCOSE SERPL-MCNC: 128 MG/DL (ref 65–100)
GLUCOSE UR STRIP.AUTO-MCNC: NEGATIVE MG/DL
HCT VFR BLD AUTO: 44 % (ref 41.1–50.3)
HGB BLD-MCNC: 13.5 G/DL (ref 13.6–17.2)
HGB UR QL STRIP: NEGATIVE
IMM GRANULOCYTES # BLD AUTO: 0 K/UL (ref 0–0.5)
IMM GRANULOCYTES NFR BLD AUTO: 0 % (ref 0–5)
INR PPP: 1
KETONES UR QL STRIP.AUTO: NEGATIVE MG/DL
LEUKOCYTE ESTERASE UR QL STRIP.AUTO: NEGATIVE
LYMPHOCYTES # BLD: 1.6 K/UL (ref 0.5–4.6)
LYMPHOCYTES NFR BLD: 34 % (ref 13–44)
MCH RBC QN AUTO: 25.6 PG (ref 26.1–32.9)
MCHC RBC AUTO-ENTMCNC: 30.7 G/DL (ref 31.4–35)
MCV RBC AUTO: 83.5 FL (ref 79.6–97.8)
MONOCYTES # BLD: 0.4 K/UL (ref 0.1–1.3)
MONOCYTES NFR BLD: 8 % (ref 4–12)
NEUTS SEG # BLD: 2.6 K/UL (ref 1.7–8.2)
NEUTS SEG NFR BLD: 54 % (ref 43–78)
NITRITE UR QL STRIP.AUTO: NEGATIVE
NRBC # BLD: 0 K/UL (ref 0–0.2)
P-R INTERVAL, ECG05: 208 MS
PH UR STRIP: 6 [PH] (ref 5–9)
PLATELET # BLD AUTO: 143 K/UL (ref 150–450)
PMV BLD AUTO: 10.5 FL (ref 9.4–12.3)
POTASSIUM SERPL-SCNC: 4.1 MMOL/L (ref 3.5–5.1)
PROT SERPL-MCNC: 8.2 G/DL (ref 6.3–8.2)
PROT UR STRIP-MCNC: NEGATIVE MG/DL
PROTHROMBIN TIME: 12.7 SEC (ref 11.7–14.5)
Q-T INTERVAL, ECG07: 462 MS
QRS DURATION, ECG06: 118 MS
QTC CALCULATION (BEZET), ECG08: 457 MS
RBC # BLD AUTO: 5.27 M/UL (ref 4.23–5.6)
SODIUM SERPL-SCNC: 142 MMOL/L (ref 136–145)
SP GR UR REFRACTOMETRY: 1.02 (ref 1–1.02)
UROBILINOGEN UR QL STRIP.AUTO: 1 EU/DL (ref 0.2–1)
VENTRICULAR RATE, ECG03: 59 BPM
WBC # BLD AUTO: 4.8 K/UL (ref 4.3–11.1)

## 2019-02-21 PROCEDURE — 85610 PROTHROMBIN TIME: CPT

## 2019-02-21 PROCEDURE — 80053 COMPREHEN METABOLIC PANEL: CPT

## 2019-02-21 PROCEDURE — 85025 COMPLETE CBC W/AUTO DIFF WBC: CPT

## 2019-02-21 PROCEDURE — 81003 URINALYSIS AUTO W/O SCOPE: CPT

## 2019-02-21 PROCEDURE — 71046 X-RAY EXAM CHEST 2 VIEWS: CPT

## 2019-02-21 NOTE — PERIOP NOTES
Recent Results (from the past 12 hour(s)) CBC WITH AUTOMATED DIFF Collection Time: 02/21/19 12:06 PM  
Result Value Ref Range WBC 4.8 4.3 - 11.1 K/uL  
 RBC 5.27 4.23 - 5.6 M/uL  
 HGB 13.5 (L) 13.6 - 17.2 g/dL HCT 44.0 41.1 - 50.3 % MCV 83.5 79.6 - 97.8 FL  
 MCH 25.6 (L) 26.1 - 32.9 PG  
 MCHC 30.7 (L) 31.4 - 35.0 g/dL  
 RDW 16.2 (H) 11.9 - 14.6 % PLATELET 593 (L) 018 - 450 K/uL MPV 10.5 9.4 - 12.3 FL ABSOLUTE NRBC 0.00 0.0 - 0.2 K/uL  
 DF AUTOMATED NEUTROPHILS 54 43 - 78 % LYMPHOCYTES 34 13 - 44 % MONOCYTES 8 4.0 - 12.0 % EOSINOPHILS 3 0.5 - 7.8 % BASOPHILS 1 0.0 - 2.0 % IMMATURE GRANULOCYTES 0 0.0 - 5.0 %  
 ABS. NEUTROPHILS 2.6 1.7 - 8.2 K/UL  
 ABS. LYMPHOCYTES 1.6 0.5 - 4.6 K/UL  
 ABS. MONOCYTES 0.4 0.1 - 1.3 K/UL  
 ABS. EOSINOPHILS 0.2 0.0 - 0.8 K/UL  
 ABS. BASOPHILS 0.0 0.0 - 0.2 K/UL  
 ABS. IMM. GRANS. 0.0 0.0 - 0.5 K/UL METABOLIC PANEL, COMPREHENSIVE Collection Time: 02/21/19 12:06 PM  
Result Value Ref Range Sodium 142 136 - 145 mmol/L Potassium 4.1 3.5 - 5.1 mmol/L Chloride 107 98 - 107 mmol/L  
 CO2 26 21 - 32 mmol/L Anion gap 9 7 - 16 mmol/L Glucose 128 (H) 65 - 100 mg/dL BUN 17 8 - 23 MG/DL Creatinine 1.05 0.8 - 1.5 MG/DL  
 GFR est AA >60 >60 ml/min/1.73m2 GFR est non-AA >60 >60 ml/min/1.73m2 Calcium 9.3 8.3 - 10.4 MG/DL Bilirubin, total 0.4 0.2 - 1.1 MG/DL  
 ALT (SGPT) 61 12 - 65 U/L  
 AST (SGOT) 96 (H) 15 - 37 U/L Alk. phosphatase 76 50 - 136 U/L Protein, total 8.2 6.3 - 8.2 g/dL Albumin 4.0 3.2 - 4.6 g/dL Globulin 4.2 (H) 2.3 - 3.5 g/dL A-G Ratio 1.0 (L) 1.2 - 3.5 URINALYSIS W/ RFLX MICROSCOPIC Collection Time: 02/21/19 12:06 PM  
Result Value Ref Range Color YELLOW Appearance CLEAR Specific gravity 1.016 1.001 - 1.023    
 pH (UA) 6.0 5.0 - 9.0 Protein NEGATIVE  NEG mg/dL Glucose NEGATIVE  mg/dL Ketone NEGATIVE  NEG mg/dL  Bilirubin NEGATIVE  NEG    
 Blood NEGATIVE  NEG Urobilinogen 1.0 0.2 - 1.0 EU/dL Nitrites NEGATIVE  NEG Leukocyte Esterase NEGATIVE  NEG PROTHROMBIN TIME + INR Collection Time: 02/21/19 12:06 PM  
Result Value Ref Range Prothrombin time 12.7 11.7 - 14.5 sec INR 1.0 EKG, 12 LEAD, INITIAL Collection Time: 02/21/19 12:19 PM  
Result Value Ref Range Ventricular Rate 59 BPM  
 Atrial Rate 59 BPM  
 P-R Interval 208 ms QRS Duration 118 ms Q-T Interval 462 ms QTC Calculation (Bezet) 457 ms Calculated P Axis 53 degrees Calculated R Axis -25 degrees Calculated T Axis -5 degrees Diagnosis Sinus bradycardia Inferior infarct , age undetermined Abnormal ECG No previous ECGs available

## 2019-02-21 NOTE — PERIOP NOTES
Patient confirms name and . Order to obtain consent  found in EHR and  matches case posting. Type 1B surgery,  assessment complete. Labs per surgeon: CBC, CMP, Pt/inr, UA, EKG and CXR Labs per anesthesia protocol: none [id number] awaiting review by anesthesiaologist 
Glucose:not required Patient provided with and instructed on educational handouts including Guide to Surgery, Pain Management, Hand Hygiene, Blood Transfusion Education, and [facility] Brochure. Patient answered medical/surgical history questions at their best of ability. All prior to admission medications documented in [facility]. Patient instructed to continue previous medications as prescribed prior to surgery and to take the following medications the day of surgery according to anesthesia guidelines with a small sip of water: aspirin 81 mg and pantoprazole  (takes metoprolol at bedtime) Patient instructed to hold all vitamins 7 days prior to surgery and NSAIDS 5 days prior to surgery, patient verbalized understanding. Medications to be held: None Patient instructed on the following: 
Arrive at MAIN Entrance, time of arrival to be called the day before by 1700 NPO after midnight including gum, mints, and ice chips. Responsible adult must drive patient to the hospital, stay during surgery, and patient will require supervision 24 hours after anesthesia. Use antibacterial soap in shower the night before surgery and on the morning of surgery. Leave all valuables (money and jewelry) at home but bring insurance card and ID on       DOS. Do not wear make-up, nail polish, lotions, cologne, perfumes, powders, or oil on skin. Patient teach back successful and patient demonstrates knowledge of instruction.

## 2019-02-27 ENCOUNTER — ANESTHESIA EVENT (OUTPATIENT)
Dept: SURGERY | Age: 69
End: 2019-02-27
Payer: MEDICARE

## 2019-02-27 RX ORDER — METRONIDAZOLE 500 MG/100ML
500 INJECTION, SOLUTION INTRAVENOUS
Status: CANCELLED | OUTPATIENT
Start: 2019-02-28 | End: 2019-03-01

## 2019-02-28 ENCOUNTER — ANESTHESIA (OUTPATIENT)
Dept: SURGERY | Age: 69
End: 2019-02-28
Payer: MEDICARE

## 2019-02-28 ENCOUNTER — HOSPITAL ENCOUNTER (OUTPATIENT)
Age: 69
Setting detail: OUTPATIENT SURGERY
Discharge: HOME | End: 2019-02-28
Attending: SURGERY | Admitting: SURGERY
Payer: MEDICARE

## 2019-02-28 VITALS
DIASTOLIC BLOOD PRESSURE: 66 MMHG | BODY MASS INDEX: 40.97 KG/M2 | RESPIRATION RATE: 15 BRPM | SYSTOLIC BLOOD PRESSURE: 107 MMHG | OXYGEN SATURATION: 96 % | WEIGHT: 261 LBS | HEIGHT: 67 IN | TEMPERATURE: 98.7 F | HEART RATE: 83 BPM

## 2019-02-28 DIAGNOSIS — R22.31 AXILLARY MASS, RIGHT: Primary | ICD-10-CM

## 2019-02-28 PROCEDURE — 77030031139 HC SUT VCRL2 J&J -A: Performed by: SURGERY

## 2019-02-28 PROCEDURE — 74011250636 HC RX REV CODE- 250/636

## 2019-02-28 PROCEDURE — 77030008467 HC STPLR SKN COVD -B: Performed by: SURGERY

## 2019-02-28 PROCEDURE — 76060000035 HC ANESTHESIA 2 TO 2.5 HR: Performed by: SURGERY

## 2019-02-28 PROCEDURE — 77030010514 HC APPL CLP LIG COVD -B: Performed by: SURGERY

## 2019-02-28 PROCEDURE — 88305 TISSUE EXAM BY PATHOLOGIST: CPT

## 2019-02-28 PROCEDURE — 77030012406 HC DRN WND PENRS BARD -A: Performed by: SURGERY

## 2019-02-28 PROCEDURE — 77030003028 HC SUT VCRL J&J -A: Performed by: SURGERY

## 2019-02-28 PROCEDURE — 74011000250 HC RX REV CODE- 250

## 2019-02-28 PROCEDURE — 77030019908 HC STETH ESOPH SIMS -A: Performed by: ANESTHESIOLOGY

## 2019-02-28 PROCEDURE — 77030039425 HC BLD LARYNG TRULITE DISP TELE -A: Performed by: ANESTHESIOLOGY

## 2019-02-28 PROCEDURE — 74011250636 HC RX REV CODE- 250/636: Performed by: SURGERY

## 2019-02-28 PROCEDURE — 77030037088 HC TUBE ENDOTRACH ORAL NSL COVD-A: Performed by: ANESTHESIOLOGY

## 2019-02-28 PROCEDURE — 76210000021 HC REC RM PH II 0.5 TO 1 HR: Performed by: SURGERY

## 2019-02-28 PROCEDURE — 74011000250 HC RX REV CODE- 250: Performed by: SURGERY

## 2019-02-28 PROCEDURE — 74011250636 HC RX REV CODE- 250/636: Performed by: ANESTHESIOLOGY

## 2019-02-28 PROCEDURE — 77030018836 HC SOL IRR NACL ICUM -A: Performed by: SURGERY

## 2019-02-28 PROCEDURE — 77030002916 HC SUT ETHLN J&J -A: Performed by: SURGERY

## 2019-02-28 PROCEDURE — 76010000131 HC OR TIME 2 TO 2.5 HR: Performed by: SURGERY

## 2019-02-28 PROCEDURE — 77030020782 HC GWN BAIR PAWS FLX 3M -B: Performed by: ANESTHESIOLOGY

## 2019-02-28 PROCEDURE — 77030013567 HC DRN WND RESERV BARD -A: Performed by: SURGERY

## 2019-02-28 PROCEDURE — 76210000006 HC OR PH I REC 0.5 TO 1 HR: Performed by: SURGERY

## 2019-02-28 PROCEDURE — 77030011283 HC ELECTRD NDL COVD -A: Performed by: SURGERY

## 2019-02-28 PROCEDURE — 77030033269 HC SLV COMPR SCD KNE2 CARD -B: Performed by: SURGERY

## 2019-02-28 RX ORDER — ROCURONIUM BROMIDE 10 MG/ML
INJECTION, SOLUTION INTRAVENOUS AS NEEDED
Status: DISCONTINUED | OUTPATIENT
Start: 2019-02-28 | End: 2019-02-28 | Stop reason: HOSPADM

## 2019-02-28 RX ORDER — HYDROMORPHONE HYDROCHLORIDE 2 MG/ML
0.5 INJECTION, SOLUTION INTRAMUSCULAR; INTRAVENOUS; SUBCUTANEOUS
Status: DISCONTINUED | OUTPATIENT
Start: 2019-02-28 | End: 2019-02-28 | Stop reason: HOSPADM

## 2019-02-28 RX ORDER — EPHEDRINE SULFATE 50 MG/ML
INJECTION, SOLUTION INTRAVENOUS AS NEEDED
Status: DISCONTINUED | OUTPATIENT
Start: 2019-02-28 | End: 2019-02-28 | Stop reason: HOSPADM

## 2019-02-28 RX ORDER — SODIUM CHLORIDE, SODIUM LACTATE, POTASSIUM CHLORIDE, CALCIUM CHLORIDE 600; 310; 30; 20 MG/100ML; MG/100ML; MG/100ML; MG/100ML
75 INJECTION, SOLUTION INTRAVENOUS CONTINUOUS
Status: DISCONTINUED | OUTPATIENT
Start: 2019-02-28 | End: 2019-02-28 | Stop reason: HOSPADM

## 2019-02-28 RX ORDER — CELECOXIB 200 MG/1
200 CAPSULE ORAL
Status: DISCONTINUED | OUTPATIENT
Start: 2019-02-28 | End: 2019-02-28 | Stop reason: HOSPADM

## 2019-02-28 RX ORDER — NEOSTIGMINE METHYLSULFATE 1 MG/ML
INJECTION INTRAVENOUS AS NEEDED
Status: DISCONTINUED | OUTPATIENT
Start: 2019-02-28 | End: 2019-02-28 | Stop reason: HOSPADM

## 2019-02-28 RX ORDER — ONDANSETRON 2 MG/ML
INJECTION INTRAMUSCULAR; INTRAVENOUS AS NEEDED
Status: DISCONTINUED | OUTPATIENT
Start: 2019-02-28 | End: 2019-02-28 | Stop reason: HOSPADM

## 2019-02-28 RX ORDER — GLYCOPYRROLATE 0.2 MG/ML
INJECTION INTRAMUSCULAR; INTRAVENOUS AS NEEDED
Status: DISCONTINUED | OUTPATIENT
Start: 2019-02-28 | End: 2019-02-28 | Stop reason: HOSPADM

## 2019-02-28 RX ORDER — DEXAMETHASONE SODIUM PHOSPHATE 4 MG/ML
INJECTION, SOLUTION INTRA-ARTICULAR; INTRALESIONAL; INTRAMUSCULAR; INTRAVENOUS; SOFT TISSUE AS NEEDED
Status: DISCONTINUED | OUTPATIENT
Start: 2019-02-28 | End: 2019-02-28 | Stop reason: HOSPADM

## 2019-02-28 RX ORDER — SODIUM CHLORIDE, SODIUM LACTATE, POTASSIUM CHLORIDE, CALCIUM CHLORIDE 600; 310; 30; 20 MG/100ML; MG/100ML; MG/100ML; MG/100ML
50 INJECTION, SOLUTION INTRAVENOUS CONTINUOUS
Status: DISCONTINUED | OUTPATIENT
Start: 2019-02-28 | End: 2019-02-28 | Stop reason: HOSPADM

## 2019-02-28 RX ORDER — SODIUM CHLORIDE 0.9 % (FLUSH) 0.9 %
5-40 SYRINGE (ML) INJECTION AS NEEDED
Status: DISCONTINUED | OUTPATIENT
Start: 2019-02-28 | End: 2019-02-28 | Stop reason: HOSPADM

## 2019-02-28 RX ORDER — OXYCODONE HYDROCHLORIDE 5 MG/1
5 TABLET ORAL
Status: DISCONTINUED | OUTPATIENT
Start: 2019-02-28 | End: 2019-02-28 | Stop reason: HOSPADM

## 2019-02-28 RX ORDER — LIDOCAINE HYDROCHLORIDE 20 MG/ML
INJECTION, SOLUTION EPIDURAL; INFILTRATION; INTRACAUDAL; PERINEURAL AS NEEDED
Status: DISCONTINUED | OUTPATIENT
Start: 2019-02-28 | End: 2019-02-28 | Stop reason: HOSPADM

## 2019-02-28 RX ORDER — FENTANYL CITRATE 50 UG/ML
INJECTION, SOLUTION INTRAMUSCULAR; INTRAVENOUS AS NEEDED
Status: DISCONTINUED | OUTPATIENT
Start: 2019-02-28 | End: 2019-02-28 | Stop reason: HOSPADM

## 2019-02-28 RX ORDER — MIDAZOLAM HYDROCHLORIDE 1 MG/ML
2 INJECTION, SOLUTION INTRAMUSCULAR; INTRAVENOUS ONCE
Status: DISCONTINUED | OUTPATIENT
Start: 2019-02-28 | End: 2019-02-28 | Stop reason: HOSPADM

## 2019-02-28 RX ORDER — FENTANYL CITRATE 50 UG/ML
100 INJECTION, SOLUTION INTRAMUSCULAR; INTRAVENOUS ONCE
Status: DISCONTINUED | OUTPATIENT
Start: 2019-02-28 | End: 2019-02-28 | Stop reason: HOSPADM

## 2019-02-28 RX ORDER — MIDAZOLAM HYDROCHLORIDE 1 MG/ML
2 INJECTION, SOLUTION INTRAMUSCULAR; INTRAVENOUS
Status: DISCONTINUED | OUTPATIENT
Start: 2019-02-28 | End: 2019-02-28 | Stop reason: HOSPADM

## 2019-02-28 RX ORDER — OXYCODONE AND ACETAMINOPHEN 7.5; 325 MG/1; MG/1
1 TABLET ORAL
Qty: 30 TAB | Refills: 0 | Status: SHIPPED | OUTPATIENT
Start: 2019-02-28 | End: 2019-03-05

## 2019-02-28 RX ORDER — LIDOCAINE HYDROCHLORIDE 10 MG/ML
0.1 INJECTION INFILTRATION; PERINEURAL AS NEEDED
Status: DISCONTINUED | OUTPATIENT
Start: 2019-02-28 | End: 2019-02-28 | Stop reason: HOSPADM

## 2019-02-28 RX ORDER — CEFAZOLIN SODIUM/WATER 2 G/20 ML
2 SYRINGE (ML) INTRAVENOUS
Status: COMPLETED | OUTPATIENT
Start: 2019-02-28 | End: 2019-02-28

## 2019-02-28 RX ORDER — BUPIVACAINE HYDROCHLORIDE AND EPINEPHRINE 5; 5 MG/ML; UG/ML
INJECTION, SOLUTION EPIDURAL; INTRACAUDAL; PERINEURAL AS NEEDED
Status: DISCONTINUED | OUTPATIENT
Start: 2019-02-28 | End: 2019-02-28 | Stop reason: HOSPADM

## 2019-02-28 RX ORDER — PROPOFOL 10 MG/ML
INJECTION, EMULSION INTRAVENOUS AS NEEDED
Status: DISCONTINUED | OUTPATIENT
Start: 2019-02-28 | End: 2019-02-28 | Stop reason: HOSPADM

## 2019-02-28 RX ORDER — SODIUM CHLORIDE 0.9 % (FLUSH) 0.9 %
5-40 SYRINGE (ML) INJECTION EVERY 8 HOURS
Status: DISCONTINUED | OUTPATIENT
Start: 2019-02-28 | End: 2019-02-28 | Stop reason: HOSPADM

## 2019-02-28 RX ORDER — ALBUTEROL SULFATE 0.83 MG/ML
2.5 SOLUTION RESPIRATORY (INHALATION) AS NEEDED
Status: DISCONTINUED | OUTPATIENT
Start: 2019-02-28 | End: 2019-02-28 | Stop reason: HOSPADM

## 2019-02-28 RX ADMIN — FENTANYL CITRATE 50 MCG: 50 INJECTION, SOLUTION INTRAMUSCULAR; INTRAVENOUS at 12:37

## 2019-02-28 RX ADMIN — Medication 2 G: at 12:15

## 2019-02-28 RX ADMIN — LIDOCAINE HYDROCHLORIDE 80 MG: 20 INJECTION, SOLUTION EPIDURAL; INFILTRATION; INTRACAUDAL; PERINEURAL at 12:04

## 2019-02-28 RX ADMIN — EPHEDRINE SULFATE 5 MG: 50 INJECTION, SOLUTION INTRAVENOUS at 13:05

## 2019-02-28 RX ADMIN — EPHEDRINE SULFATE 5 MG: 50 INJECTION, SOLUTION INTRAVENOUS at 12:31

## 2019-02-28 RX ADMIN — SODIUM CHLORIDE, SODIUM LACTATE, POTASSIUM CHLORIDE, AND CALCIUM CHLORIDE 75 ML/HR: 600; 310; 30; 20 INJECTION, SOLUTION INTRAVENOUS at 10:11

## 2019-02-28 RX ADMIN — ROCURONIUM BROMIDE 50 MG: 10 INJECTION, SOLUTION INTRAVENOUS at 12:05

## 2019-02-28 RX ADMIN — ONDANSETRON 4 MG: 2 INJECTION INTRAMUSCULAR; INTRAVENOUS at 13:49

## 2019-02-28 RX ADMIN — EPHEDRINE SULFATE 10 MG: 50 INJECTION, SOLUTION INTRAVENOUS at 12:21

## 2019-02-28 RX ADMIN — DEXAMETHASONE SODIUM PHOSPHATE 4 MG: 4 INJECTION, SOLUTION INTRA-ARTICULAR; INTRALESIONAL; INTRAMUSCULAR; INTRAVENOUS; SOFT TISSUE at 12:26

## 2019-02-28 RX ADMIN — NEOSTIGMINE METHYLSULFATE 5 MG: 1 INJECTION INTRAVENOUS at 14:00

## 2019-02-28 RX ADMIN — GLYCOPYRROLATE 0.8 MG: 0.2 INJECTION INTRAMUSCULAR; INTRAVENOUS at 14:00

## 2019-02-28 RX ADMIN — PROPOFOL 180 MG: 10 INJECTION, EMULSION INTRAVENOUS at 12:04

## 2019-02-28 RX ADMIN — EPHEDRINE SULFATE 5 MG: 50 INJECTION, SOLUTION INTRAVENOUS at 12:23

## 2019-02-28 RX ADMIN — FENTANYL CITRATE 100 MCG: 50 INJECTION, SOLUTION INTRAMUSCULAR; INTRAVENOUS at 12:04

## 2019-02-28 NOTE — ANESTHESIA POSTPROCEDURE EVALUATION
Procedure(s): MASS EXCISION RIGHT AXILLA. Anesthesia Post Evaluation Multimodal analgesia: multimodal analgesia used between 6 hours prior to anesthesia start to PACU discharge Patient location during evaluation: PACU Patient participation: complete - patient participated Level of consciousness: responsive to verbal stimuli and awake Pain management: adequate Airway patency: patent Anesthetic complications: no 
Cardiovascular status: acceptable Respiratory status: acceptable, spontaneous ventilation and nonlabored ventilation Hydration status: acceptable Post anesthesia nausea and vomiting:  none Visit Vitals /66 Pulse 88 Temp 37.1 °C (98.7 °F) Resp 15 Ht 5' 7\" (1.702 m) Wt 118.4 kg (261 lb) SpO2 96% BMI 40.88 kg/m²

## 2019-02-28 NOTE — H&P
[facility] 
[facility]. [address], [address] [place], [address] [facility] 
[phone]  
  
Patient:  Rahul Ashton. : 1950 
  
HPI Rahul Ashton. is a 76 y.o. male who is seen for post op follow up of his recent emergency lap appendectomy. He is doing well from surgery, no complaints at all. But he brings up two new issues for today's visit.  
  
He has a large mass in right axilla for a few years, he doesn't think it's growing and he denies pain, he feels some pressure. This mass was biopsied percutaneously about a year ago, which returned as benign. He likes this to be removed though.  
  
He also reports acid reflux, he was told to have hiatal hernia. He is on protonix, which controls his symptoms well most of time.  
  
His other medical conditions are reviewed as below.  
  
  
    
Past Medical History:  
Diagnosis Date  Burning with urination    
 Headache    
 Heart attack [facility])    
  minor - 2008  Dr Robbert Goodpasture  Hypercholesterolemia    
 Hypertension    
 MI, old    
 Morbid obesity ([facility] [address]    
  
      
Current Outpatient Medications Medication Sig Dispense Refill  OTHER        
 HYDROcodone-acetaminophen (NORCO) 7.5-325 mg per tablet Take 1 Tab by mouth every four (4) hours as needed for Pain. Max Daily Amount: 6 Tabs. 1-2 tabs by mouth every 4 hours prn 25 Tab 0  
 magnesium hydroxide (MILK OF MAGNESIA) 400 mg/5 mL suspension Take 30 mL by mouth two (2) times daily as needed for Constipation. 1 Bottle 0  
 tiZANidine (ZANAFLEX) 2 mg tablet Take 1 Tab by mouth two (2) times daily (with meals). 180 Tab 1  
 atorvastatin (LIPITOR) 10 mg tablet Take 1 Tab by mouth daily. 90 Tab 1  
 metoprolol succinate (TOPROL-XL) 100 mg tablet Take 1 Tab by mouth daily. 90 Tab 1  
 fluticasone (FLONASE) 50 mcg/actuation nasal spray 2 Sprays by Both Nostrils route daily. 3 Bottle 1  
 losartan (COZAAR) 25 mg tablet Take 1 Tab by mouth daily.  Indications: hypertension 90 Tab 1  
 pantoprazole (PROTONIX) 40 mg tablet Take 1 Tab by mouth daily. 90 Tab 3  varicella-zoster recombinant, PF, (SHINGRIX, PF,) 50 mcg/0.5 mL susr injection 0.5mL by IntraMUSCular route once now and then repeat in 2-6 months 0.5 mL 1  
 magnesium oxide (MAG-OX) 400 mg tablet Take 1 Tab by mouth daily. 90 Tab 3  
 aspirin delayed-release 81 mg tablet Take 81 mg by mouth daily.      
 nitroglycerin (NITROSTAT) 0.4 mg SL tablet 1 Tab by SubLINGual route every five (5) minutes as needed for Chest Pain. Indications: ANGINA 25 Tab 11  
 vitamin E (AQUA GEMS) 400 unit capsule Take  by mouth daily.      
 omega 3-dha-epa-fish oil 900-1,400 mg cpDR Take  by mouth.      
 Potassium Gluconate 595 mg (99 mg) tablet Take  by mouth daily.      
  
No Known Allergies Past Surgical History:  
Procedure Laterality Date  CARDIAC SURG PROCEDURE UNLIST      
  ht cath  HX CHOLECYSTECTOMY     
  polyps  HX COLONOSCOPY      
  about  Dr. Yaw Dennis [facility] ENDOSCOPY     
  Dr. Yaw Dennis  HX HERNIA REPAIR      
 HX SKIN BIOPSY   2017  
  
     
Family History Problem Relation Age of Onset  Alzheimer Mother    
 Heart Disease Father    
  
Social History  
  
     
Tobacco Use  Smoking status: Former Smoker  
    Last attempt to quit: 2007  
    Years since quittin.0  Smokeless tobacco: Never Used Substance Use Topics  Alcohol use: Yes  
    Comment: occ   
  
  
Review of Systems A comprehensive review of systems was negative except for that written in the HPI. Physical Exam 
Visit Vitals Ht 5' 7.5\" (1.715 m) Wt 255 lb (115.7 kg) BMI 39.35 kg/m²  
  
  
General:          Alert, oriented, cooperative, awake patient in no acute distress Skin:                Warm, moist with good texture Eyes:               Sclera are clear, extraocular muscles intact HENT:             Normocephalic; oral mucosa moist, nares patent; neck is supple; trachea midline Respiratory:     Lungs clear to auscultation bilaterally, breathing is non-labored Chest:              Symmetric throughout one respiratory excursion; no supraclavicular lymphadenopathy CV:                  Regular rate and rhythm, no appreciable murmurs, rubs, gallops Abdomen:        Soft, protuberant but non-distended; bowel sounds are normoactive. Incision looks good. Extremities:     No cyanosis, clubbing or edema Neurological:   No focal signs. There is a large firm subcutaneous mass in right axillary fossa, mobile, nontender. 
  
  
Labs:  
     
Lab Results Component Value Date/Time  
  aPTT 28 05/15/2017 12:22 PM  
  Prothrombin time 10.4 05/15/2017 12:22 PM  
  INR 1.0 05/15/2017 12:22 PM  
  
  
Assessment/Plan: 
 Keeley Mancia is a 76 y.o. male who has signs and symptoms consistent with large right axillary fossa mass, it's probably benign but causing pressure.  Will remove per his request.  
Jailyn Viera MD

## 2019-02-28 NOTE — DISCHARGE INSTRUCTIONS
Drain teaching, empty when it's half full and record volume. Strip tube 4 times a day. Sponge bath until next Tuesday      [facility] care    How do I empty my [facility]? You or a family member will need to empty the  drain a few times each day. Usually you empty the SARA drain when it is half full but follow caregiver's instructions closely. · Gather all the items you will need. · Clean urine collection cup or container to measure drainage. · Clean rubber or latex exam gloves. · Waterproof pad or bath towel. · Bowl of warm water, soap, washcloth, and hand towel. · Notebook to write down fluid amounts and information. · Wash your hands with warm water and soap. Dry your hands completely. · Put on clean gloves. · Unpin the drain tube and bulb from your clothing. · Place the waterproof pad or towel under the side where the drain is to soak up any spills. · Hold the bulb in one hand lower than the wound. This will prevent drainage from flowing back into the tubing and wound. · With the other hand, remove the plug from the drainage spout. Tip the bulb upside down and squeeze the fluid into the collection cup. Do not touch the tip of the spout with the mouth of the collection cup or anything else. This re-creates the suction inside the bulb. Do not squeeze the bulb if the plug is in place. · If your caregiver allows, squeeze the fluid in the tubing near the bandaged site and move it down into the bulb. This is called \"milking\" the tubing. Only milk the tubing if caregivers instruct you closely on how to do this. · Re-pin the tape tab of the drain tubing and bulb back to your clothing. · Gently wash any areas where fluid spilled with warm soapy water. Do not get the drain bandage wet. · Flush the drainage fluid and wash water down the toilet.   Put all used supplies in the trash bag along with your gloves, which you take off last.   · Wash your hands and dry your hands. · Write down the amount, color, and odor of the fluid. Caregivers will look at this information during your next visit. After general anesthesia or intravenous sedation, for 24 hours or while taking prescription Narcotics:  · Limit your activities  · Do not drive and operate hazardous machinery  · Do not make important personal or business decisions  · Do  not drink alcoholic beverages  · If you have not urinated within 8 hours after discharge, please contact your surgeon on call. *  Please give a list of your current medications to your Primary Care Provider. *  Please update this list whenever your medications are discontinued, doses are      changed, or new medications (including over-the-counter products) are added. *  Please carry medication information at all times in case of emergency situations. These are general instructions for a healthy lifestyle:  No smoking/ No tobacco products/ Avoid exposure to second hand smoke  Surgeon General's Warning:  Quitting smoking now greatly reduces serious risk to your health. Obesity, smoking, and sedentary lifestyle greatly increases your risk for illness  A healthy diet, regular physical exercise & weight monitoring are important for maintaining a healthy lifestyle    You may be retaining fluid if you have a history of heart failure or if you experience any of the following symptoms:  Weight gain of 3 pounds or more overnight or 5 pounds in a week, increased swelling in our hands or feet or shortness of breath while lying flat in bed. Please call your doctor as soon as you notice any of these symptoms; do not wait until your next office visit. Recognize signs and symptoms of STROKE:  F-face looks uneven  A-arms unable to move or move unevenly  S-speech slurred or non-existent  T-time-call 911 as soon as signs and symptoms begin-DO NOT go       Back to bed or wait to see if you get better-TIME IS BRAIN.

## 2019-02-28 NOTE — BRIEF OP NOTE
BRIEF OPERATIVE NOTE Date of Procedure: 2/28/2019 Preoperative Diagnosis: Axillary mass, right [R22.31] Postoperative Diagnosis: Axillary mass, right [R22.31] Procedure(s): MASS EXCISION RIGHT AXILLA 15x13 cm Surgeon(s) and Role: 
   Kathleen Truong MD - Primary Surgical Assistant: Ludivina Leroy NP Surgical Staff: 
Circ-1: Esther Celeste RN 
Circ-Relief: Amalia Narvaez RN Scrub Tech-1: Rey Meeks Scrub Tech-3: Maye Ardon Time In Time Out Incision Start 12:30 PM   
Incision Close  2:01 PM   
 
Anesthesia: General  
Estimated Blood Loss: 75 ml Specimens:  
ID Type Source Tests Collected by Time Destination 1 : Axillary mass Fresh Nelly Acuna MD 2/28/2019  1:40 PM Pathology 2 : Final Margin Preservative Nelly Acuna MD 2/28/2019  1:47 PM Pathology Findings: large mass arising from back muscle or tendon with pushing effect on axillary nerve and vessels, a final margin was obtained on its presumed origin (back muscle) Complications: none Implants: SARA x 1

## 2019-02-28 NOTE — ANESTHESIA PREPROCEDURE EVALUATION
Anesthetic History No history of anesthetic complications Review of Systems / Medical History Patient summary reviewed and pertinent labs reviewed Pulmonary Sleep apnea: CPAP Neuro/Psych Within defined limits Cardiovascular Hypertension: well controlled Past MI (20 years ago) and CAD Exercise tolerance: >4 METS Comments: Nml stress test 12/2017 Nml ECHO 2017 with slightly reduced EF 50% Denies CP, SOB or changes in functional status GI/Hepatic/Renal 
  
GERD: well controlled Endo/Other Morbid obesity Other Findings Physical Exam 
 
Airway Mallampati: II 
TM Distance: 4 - 6 cm Neck ROM: normal range of motion Mouth opening: Normal 
 
 Cardiovascular Rhythm: regular Rate: normal 
 
 
 
 Dental 
 
Dentition: Edentulous Pulmonary Breath sounds clear to auscultation Abdominal 
GI exam deferred Other Findings Anesthetic Plan ASA: 3 Anesthesia type: general 
 
 
 
 
Induction: Intravenous Anesthetic plan and risks discussed with: Patient and Spouse

## 2019-03-01 NOTE — OP NOTES
[address] [facility]  OPERATIVE REPORT    Name:  Adebayo Flores  MR#:  [id number]  :  1950  ACCOUNT #:  [de-identified]  DATE OF SERVICE:  2019    PREOPERATIVE DIAGNOSIS:  Large right axillary fossa mass. POSTOPERATIVE DIAGNOSIS:  Large right axillary fossa mass. PROCEDURE PERFORMED:  Right axillary fossa mass resection, 15 x 13 cm. SURGEON:  Eyal Turner MD    ASSISTANT:  Cooper Bolton NP    ANESTHESIA:  General.    COMPLICATIONS: none    SPECIMENS REMOVED:  As above    IMPLANTS: SARA x 1    ESTIMATED BLOOD LOSS:  About 75 mL. INDICATION:  This is a 55-year-old gentleman, who presented with a large mass in the right axillary fossa. He says it has been there for years and it is slowly growing and it does cause discomfort. He wanted this to be removed. The patient understood the risks and benefits and agreed to proceed. FINDINGS:  This was indeed a large mass arising from deep axillary fossa. It appears to be arising from the back muscle or tendon with a pushing effect on axillary nerve and vessels. The final margin was obtained on its presumed origin (back muscle). PROCEDURE:  After informed consent was obtained, the patient was brought into the operating room and left in supine position. General anesthesia was administered. The patient's right arm was extended 90 degrees and his upper chest, arm, and axillary fossa were prepped and draped in routine fashion. I took a generous transverse incision right below the axillary fossa. Dissection was carried through the dermal layer and a large firm mass was immediately palpated. I tried to stay outside the mass and the dissection carried in the subcutaneous layer and soon we noticed the mass was going deep into axillary fossa and appeared to be pushing on axillary vessel and the nerves and great care was used to dissect the vessel away. As noted, the patient did not have paralytics which did help us avoid nerve injury. Eventually, the mass was found to attach to either a tendon or muscle posteriorly. This appeared to be a muscle from the back. With great care, the mass was carefully dissected from the muscle. Some capsule were ruptured and I removed all the visible tumors from the muscle and tendon and a final margin was sent from this area and then surgical field was inspected. Good hemostasis obtained. Copious irrigation was used with sterile water. A #19 SARA left in place and then dermal layer was closed with 3-0 Vicryl stitch in interrupted fashion. Skin closed with staples. The patient tolerated the procedure well, transferred to recovery room in stable condition. All the instrument and lap counts were correct. Estimated blood loss was about 75 mL. Keyanna Flores was present and assisted during the entire case.       Ivett Saucedo MD      [id number]  D:  02/28/2019 14:25  T:  03/01/2019 1:10  JOB #:  [id number]

## 2019-08-23 PROBLEM — R53.1 WEAKNESS: Status: ACTIVE | Noted: 2019-08-23

## 2019-08-23 PROBLEM — R20.2 PARESTHESIA: Status: ACTIVE | Noted: 2019-08-23

## 2019-08-23 PROBLEM — G54.0 BRACHIAL PLEXOPATHY: Status: ACTIVE | Noted: 2019-08-23

## 2019-08-23 PROBLEM — M18.10 ARTHRITIS OF CARPOMETACARPAL (CMC) JOINT OF THUMB: Status: ACTIVE | Noted: 2019-08-23

## 2019-09-11 ENCOUNTER — HOSPITAL ENCOUNTER (OUTPATIENT)
Dept: MRI IMAGING | Age: 69
Discharge: HOME | End: 2019-09-11
Attending: PSYCHIATRY & NEUROLOGY
Payer: MEDICARE

## 2019-09-11 DIAGNOSIS — G54.0 BRACHIAL PLEXOPATHY: ICD-10-CM

## 2019-09-11 PROCEDURE — 71552 MRI CHEST W/O & W/DYE: CPT

## 2019-09-11 PROCEDURE — A9575 INJ GADOTERATE MEGLUMI 0.1ML: HCPCS | Performed by: PSYCHIATRY & NEUROLOGY

## 2019-09-11 PROCEDURE — 74011250636 HC RX REV CODE- 250/636: Performed by: PSYCHIATRY & NEUROLOGY

## 2019-09-11 RX ORDER — GADOTERATE MEGLUMINE 376.9 MG/ML
23 INJECTION INTRAVENOUS
Status: COMPLETED | OUTPATIENT
Start: 2019-09-11 | End: 2019-09-11

## 2019-09-11 RX ORDER — SODIUM CHLORIDE 0.9 % (FLUSH) 0.9 %
10 SYRINGE (ML) INJECTION
Status: COMPLETED | OUTPATIENT
Start: 2019-09-11 | End: 2019-09-11

## 2019-09-11 RX ADMIN — GADOTERATE MEGLUMINE 23 ML: 376.9 INJECTION INTRAVENOUS at 14:58

## 2019-09-11 RX ADMIN — Medication 10 ML: at 14:58

## 2019-09-12 NOTE — PROGRESS NOTES
Mr Naga Girard [de-identified]      The brachial plexus / chest MRi scanning did not reveal a lesion at the brachial plexus - but, instead points toward the cervical region. Thus, as recommended by radiology / imaging per report I will send in order for a Cervical c-spine MRI scan to complete the evaluation.        Quan King MD  Consultative Neurology, Neurodiagnostics and Neurotherapeutics  Neuroelectrophysiology, EEG, EMG  [facility]  [address] [place] [address], [address] [place] [address]  Phone:  [phone]  Fax:   [phone]

## 2019-10-03 ENCOUNTER — HOSPITAL ENCOUNTER (OUTPATIENT)
Dept: MRI IMAGING | Age: 69
Discharge: HOME | End: 2019-10-03
Attending: PSYCHIATRY & NEUROLOGY
Payer: MEDICARE

## 2019-10-03 DIAGNOSIS — R53.1 WEAKNESS: ICD-10-CM

## 2019-10-03 DIAGNOSIS — R20.2 PARESTHESIA: ICD-10-CM

## 2019-10-03 PROCEDURE — 72141 MRI NECK SPINE W/O DYE: CPT

## 2019-10-23 ENCOUNTER — HOSPITAL ENCOUNTER (OUTPATIENT)
Dept: PHYSICAL THERAPY | Age: 69
Discharge: HOME | End: 2019-10-23
Attending: PSYCHIATRY & NEUROLOGY
Payer: MEDICARE

## 2019-10-24 ENCOUNTER — APPOINTMENT (RX ONLY)
Dept: URBAN - METROPOLITAN AREA CLINIC 349 | Facility: CLINIC | Age: 69
Setting detail: DERMATOLOGY
End: 2019-10-24

## 2019-10-24 DIAGNOSIS — D485 NEOPLASM OF UNCERTAIN BEHAVIOR OF SKIN: ICD-10-CM

## 2019-10-24 DIAGNOSIS — L21.8 OTHER SEBORRHEIC DERMATITIS: ICD-10-CM

## 2019-10-24 DIAGNOSIS — L82.1 OTHER SEBORRHEIC KERATOSIS: ICD-10-CM

## 2019-10-24 DIAGNOSIS — L72.0 EPIDERMAL CYST: ICD-10-CM

## 2019-10-24 PROBLEM — D48.5 NEOPLASM OF UNCERTAIN BEHAVIOR OF SKIN: Status: ACTIVE | Noted: 2019-10-24

## 2019-10-24 PROBLEM — I10 ESSENTIAL (PRIMARY) HYPERTENSION: Status: ACTIVE | Noted: 2019-10-24

## 2019-10-24 PROCEDURE — ? BIOPSY BY SHAVE METHOD

## 2019-10-24 PROCEDURE — A4550 SURGICAL TRAYS: HCPCS

## 2019-10-24 PROCEDURE — 99213 OFFICE O/P EST LOW 20 MIN: CPT | Mod: 25

## 2019-10-24 PROCEDURE — ? TREATMENT REGIMEN

## 2019-10-24 PROCEDURE — ? OTHER

## 2019-10-24 PROCEDURE — ? OBSERVATION

## 2019-10-24 PROCEDURE — ? COUNSELING

## 2019-10-24 PROCEDURE — 11102 TANGNTL BX SKIN SINGLE LES: CPT

## 2019-10-24 ASSESSMENT — LOCATION DETAILED DESCRIPTION DERM
LOCATION DETAILED: RIGHT FOREHEAD
LOCATION DETAILED: RIGHT LATERAL MALAR CHEEK
LOCATION DETAILED: LEFT CENTRAL MALAR CHEEK
LOCATION DETAILED: LEFT SUPERIOR PARIETAL SCALP
LOCATION DETAILED: RIGHT MEDIAL FOREHEAD
LOCATION DETAILED: NASAL TIP

## 2019-10-24 ASSESSMENT — LOCATION SIMPLE DESCRIPTION DERM
LOCATION SIMPLE: NOSE
LOCATION SIMPLE: RIGHT CHEEK
LOCATION SIMPLE: LEFT CHEEK
LOCATION SIMPLE: SCALP
LOCATION SIMPLE: RIGHT FOREHEAD

## 2019-10-24 ASSESSMENT — LOCATION ZONE DERM
LOCATION ZONE: FACE
LOCATION ZONE: NOSE
LOCATION ZONE: SCALP

## 2019-10-24 NOTE — PROCEDURE: OTHER
Note Text (......Xxx Chief Complaint.): This diagnosis correlates with the
Other (Free Text): Says it has only been present for a few days
Other (Free Text): Patient is using t sal on his scalp and it is helping. Mildred stated that patient can wash his face with that as well and that can help with his dry and flakey skin on his face. Offered a topical steroid but patient declined at this time.
Detail Level: Zone

## 2019-10-24 NOTE — PROCEDURE: BIOPSY BY SHAVE METHOD
Electrodesiccation And Curettage Text: The wound bed was treated with electrodesiccation and curettage after the biopsy was performed.
Electrodesiccation Text: The wound bed was treated with electrodesiccation after the biopsy was performed.
Destruction After The Procedure: No
Anesthesia Type: 2% lidocaine with epinephrine
Accession #: global
Biopsy Method: Dermablade
Billing Type: Third-Party Bill
Size Of Lesion In Cm: 0
Bill For Surgical Tray: yes
Path Notes (To The Dermatopathologist): Check margins.
Consent: Written consent was obtained and risks were reviewed including but not limited to scarring, infection, bleeding, scabbing, incomplete removal, nerve damage and allergy to anesthesia.
Detail Level: Detailed
Curettage Text: The wound bed was treated with curettage after the biopsy was performed.
Silver Nitrate Text: The wound bed was treated with silver nitrate after the biopsy was performed.
Anesthesia Volume In Cc (Will Not Render If 0): 0.5
Biopsy Type: H and E
Hemostasis: Electrodesiccation
Dressing: Band-Aid
Type Of Destruction Used: Curettage
Depth Of Biopsy: dermis
Post-Care Instructions: I reviewed with the patient in detail post-care instructions. Patient is to keep the biopsy site dry overnight, and then apply bacitracin twice daily until healed. Patient may apply hydrogen peroxide soaks to remove any crusting.
Notification Instructions: Patient will be notified of biopsy results. However, patient instructed to call the office if not contacted within 2 weeks.
Wound Care: Vaseline
Cryotherapy Text: The wound bed was treated with cryotherapy after the biopsy was performed.

## 2019-10-24 NOTE — PROCEDURE: TREATMENT REGIMEN
Initiate Treatment: Onexton apply to spot once daily as needed
Samples Given: Onexton
Detail Level: Zone

## 2019-10-24 NOTE — HPI: SKIN LESION
What Type Of Note Output Would You Prefer (Optional)?: Standard Output
How Severe Is Your Skin Lesion?: mild
Has Your Skin Lesion Been Treated?: not been treated
Is This A New Presentation, Or A Follow-Up?: Skin Lesion
Additional History: Patient is here to have spots looked at on his abdomen and face. Patient denies any growing, bleeding or changing. Patient denies family or personal history of melanoma.

## 2019-10-25 ENCOUNTER — HOSPITAL ENCOUNTER (OUTPATIENT)
Dept: PHYSICAL THERAPY | Age: 69
Discharge: HOME | End: 2019-10-25
Attending: PSYCHIATRY & NEUROLOGY
Payer: MEDICARE

## 2019-10-25 PROCEDURE — 97110 THERAPEUTIC EXERCISES: CPT

## 2019-10-25 PROCEDURE — 97162 PT EVAL MOD COMPLEX 30 MIN: CPT

## 2019-10-25 NOTE — THERAPY EVALUATION
Sean Denis Draft. : 1950  [place]  Secondary: [place] [employer] [address] at [facility]  [address] [facility], [address] [place] [address],[address], [facility] [address]  [phone]   [phone]          [address] Assessment 10/25/2019   ICD-10: Treatment Diagnosis:    Muscle weakness (generalized) (M62.81)   Precautions/Allergies:   Amlodipine   TREATMENT PLAN:  Effective Dates: 10/25/2019 TO 2020 (90 days). Frequency/Duration: 1 time a week for 90 Day(s) MEDICAL/REFERRING DIAGNOSIS:  Brachial plexus disorders [G54.0]   DATE OF ONSET: Chronic  REFERRING PHYSICIAN: Marlon Cheung MD MD Orders: Evaluate and Treat  Return MD Appointment: TBD     INITIAL ASSESSMENT:  Mr. Kian Heller presents with decreased sensation and pain in R hand/thumb. After discussing with patient, he agreed he would benefit from physical therapy to improve above deficits. Please sign this plan of treatment if you concur. Thank you for the opportunity to serve this patient. PROBLEM LIST (Impacting functional limitations):  1. Decreased Strength  2. Decreased ADL/Functional Activities  3. Increased Pain  4. Decreased Activity Tolerance INTERVENTIONS PLANNED: (Treatment may consist of any combination of the following)  1. Cold  2. Electrical Stimulation  3. Heat  4. Home Exercise Program (HEP)  5. Manual Therapy  6. Neuromuscular Re-education/Strengthening  7. Range of Motion (ROM)  8. Therapeutic Activites  9. Therapeutic Exercise/Strengthening  10. Transcutaneous Electrical Nerve Stimulation (TENS)  11. Ultrasound (US)     GOALS: (Goals have been discussed and agreed upon with patient.)  Short-Term Functional Goals: Time Frame: 30 days  1. Patient will be independent with home exercise program without exacerbation of symptoms or cueing needed. Discharge Goals: Time Frame: 90 days  1.  Patient will be independent with all ADLs with minimal onset of right hand/thumb pain pain and no deficits with daily tasks. 2. Patient will report no fear avoidance with social or recreational activities due to right hand/thumb pain. 3. Patient will score less than or equal to 12/55 on Quick DASH with minimal effect of right hand/thumb pain on patient's ability to manage every day life activities. OUTCOME MEASURE:   Tool Used: Disabilities of the Arm, Shoulder and Hand (DASH) Questionnaire - Quick Version  Score:  Initial: 22/55  Most Recent: X/55 (Date: -- )   Interpretation of Score: The DASH is designed to measure the activities of daily living in person's with upper extremity dysfunction or pain. Each section is scored on a 1-5 scale, 5 representing the greatest disability. The scores of each section are added together for a total score of 55. MEDICAL NECESSITY:   · Patient is expected to demonstrate progress in strength and range of motion to improve safety during daily activities. · Patient demonstrates good rehab potential due to higher previous functional level. · Skilled intervention continues to be required due to decreased mobility. REASON FOR SERVICES/OTHER COMMENTS:  · Patient continues to demonstrate capacity to improve overall functional mobility which will increase independence and increase safety. Total Duration:  PT Patient Time In/Time Out  Time In: 1100  Time Out: 1145    Rehabilitation Potential For Stated Goals: Good  Regarding Caio Rosado Jr.'s therapy, I certify that the treatment plan above will be carried out by a therapist or under their direction. Thank you for this referral,  Olman Braun, PT     Referring Physician Signature:  Kathy Bonner MD _______________________________ Date _____________     PAIN/SUBJECTIVE:   Initial: Pain Intensity 1: 5  Pain Location 1: Arm, Hand  Pain Orientation 1: Right  Post Session:  0/10   HISTORY:   History of Injury/Illness (Reason for Referral):  Patient reports he has had some numbness in his axilla region that started several years ago. He reports that they removed a mass from his axilla region later. He reports that the numbness started before they found the mass. He reports that the numbness he feels in his axilla region bothers him. He reports he is also having some hand/thumb pain on his right UE that seems to be getting worse. He reports that he is dropping things at times, but he notices that happens more in the afternoons, especially after he has used his hand/arm a lot that day. He reports he would just like to know what is going on. He reports he has noticed that some of the numbness is getting better. Past Medical History/Comorbidities:   Mr. Gregery Boas  has a past medical history of Anemia, Burning with urination, Fatty liver, Former smoker, stopped smoking in distant past, GERD (gastroesophageal reflux disease), Headache, Heart attack ([facility] [address] Hypercholesterolemia, Hypertension, MI, old, Morbid obesity ([facility] [address] (02/21/2019), and ANNABELLE (obstructive sleep apnea) (7/17/2017). Mr. Gregery Boas  has a past surgical history that includes hx skin biopsy (05/2017); hx colonoscopy; hx endoscopy (2015); hx heart catheterization (~2008); hx appendectomy (12/18/2018); and hx hernia repair (~1990s). Social History/Living Environment:   Home Environment: Private residence  Living Alone: No  Support Systems: Friends \ neighbors, Family member(s)  Prior Level of Function/Work/Activity:  Independent  Dominant Side:         RIGHT  Personal Factors:          Sex:  male        Age:  76 y.o. Ambulatory/Rehab Services H2 Model Falls Risk Assessment   Risk Factors:       (1)  Gender [Male] Ability to Rise from Chair:       (0)  Ability to rise in a single movement   Falls Prevention Plan:       No modifications necessary   Total: (5 or greater = High Risk): 1   ©2010 [employer]. All Rights Reserved. [facility] [employer] Patent #[id number].  Federal Law prohibits the replication, distribution or use without written permission from [facility]   Current Medications:       Current Outpatient Medications:     rosuvastatin (CRESTOR) 10 mg tablet, Take 1 Tab by mouth nightly., Disp: 90 Tab, Rfl: 1    metoprolol succinate (TOPROL-XL) 100 mg tablet, Take 1 Tab by mouth every evening., Disp: 90 Tab, Rfl: 3    losartan (COZAAR) 25 mg tablet, Take 1 Tab by mouth daily. Indications: high blood pressure, Disp: 90 Tab, Rfl: 1    TURMERIC PO, Take 1 Cap by mouth daily. , Disp: , Rfl:     fluticasone (FLONASE) 50 mcg/actuation nasal spray, 2 Sprays by Both Nostrils route daily. (Patient taking differently: 2 Sprays by Both Nostrils route daily as needed.), Disp: 3 Bottle, Rfl: 1    tiZANidine (ZANAFLEX) 2 mg tablet, Take 1 Tab by mouth two (2) times daily (with meals). (Patient taking differently: Take 2 mg by mouth two (2) times daily as needed.), Disp: 180 Tab, Rfl: 1    pantoprazole (PROTONIX) 40 mg tablet, Take 1 Tab by mouth daily. (Patient taking differently: Take 40 mg by mouth every morning.), Disp: 90 Tab, Rfl: 3    magnesium oxide (MAG-OX) 400 mg tablet, Take 1 Tab by mouth daily. , Disp: 90 Tab, Rfl: 3    aspirin delayed-release 81 mg tablet, Take 81 mg by mouth daily. , Disp: , Rfl:     vitamin E (AQUA GEMS) 400 unit capsule, Take 400 Units by mouth daily. , Disp: , Rfl:     omega 3-dha-epa-fish oil 900-1,400 mg cpDR, Take 1 Tab by mouth daily. , Disp: , Rfl:    Date Last Reviewed:  10/25/2019    Number of Personal Factors/Comorbidities that affect the Plan of Care: 1-2: MODERATE COMPLEXITY   EXAMINATION:   Observation/Orthostatic Postural Assessment:          Posture Assessment: Rounded shoulders, Forward head   Palpation:          Tender to palpation noted along thenar eminence and musculature of right hand.   ROM:          R UE Assessment (AROM):  · Shoulder Flexion: 180 degrees  · Shoulder Extension: 10 degrees  · Shoulder Abduction: 170 degrees  · Shoulder Adduction: 0 degrees  · Shoulder Internal rotation: 70 degrees  · Shoulder External rotation: 70 degrees  · Elbow Flexion: 120 degrees  · Elbow Extension: 0 degrees        Cervical Assessment (AROM):  · Flexion: 90% of full AROM  · Extension: 90% of full AROM  · Right side bendin% of full AROM  · Left side bending[de-identified] 90% of full AROM  · Right rotation: 90% of full AROM  · Left rotation: 90% of full AROM  Strength:          R UE Assessment (Strength): · Shoulder Flexion: 4/5 with manual muscle testing  · Shoulder Extension: 4/5 with manual muscle testing  · Shoulder Abduction: 4/5 with manual muscle testing  · Shoulder Adduction: 4/5 with manual muscle testing  · Shoulder Internal rotation: 4/5 with manual muscle testing  · Shoulder External rotation: 4/5 with manual muscle testing  · Elbow Flexion: 4/5 with manual muscle testing  · Elbow Extension: 4/5 with manual muscle testing        Cervical Assessment (Strength):  · Grossly 4/5 with manual muscle testing  Special Tests:          Negative Hautant's test. Negative Cranial-[place] lift test. Negative [place]-Axis Sheer test. Negative Alar Ligament test. Negative Tectorial Membrane test.   Neurological Screen:        Dermatomes: Within normal limits        Reflexes:  2+  Functional Mobility:         Gait/Mobility:    ·   Independent         Transfers:     · Sit to Stand: Independent  · Stand to Sit: Independent  · Stand Pivot Transfers: Independent  · Bed to Chair: Independent  · Lateral Transfers: Independent         Bed Mobility:     · Rolling: Independent  · Supine to Sit: Independent  · Sit to Supine: Independent  · Scooting: Independent        Body Structures Involved:  1. Nerves  2. Joints  3. Muscles Body Functions Affected:  1. Neuromusculoskeletal  2. Movement Related Activities and Participation Affected:  1. Mobility  2.  Self Care   Number of elements (examined above) that affect the Plan of Care: 4+: HIGH COMPLEXITY   CLINICAL PRESENTATION:   Presentation: Evolving clinical presentation with changing clinical characteristics: MODERATE COMPLEXITY   CLINICAL DECISION MAKING:   Use of outcome tool(s) and clinical judgement create a POC that gives a: Questionable prediction of patient's progress: MODERATE COMPLEXITY

## 2019-11-04 PROBLEM — E66.9 OBESITY (BMI 30-39.9): Status: RESOLVED | Noted: 2017-07-17 | Resolved: 2019-11-04

## 2019-11-06 ENCOUNTER — HOSPITAL ENCOUNTER (OUTPATIENT)
Dept: PHYSICAL THERAPY | Age: 69
Discharge: HOME | End: 2019-11-06
Attending: PSYCHIATRY & NEUROLOGY
Payer: MEDICARE

## 2019-11-06 PROCEDURE — 97110 THERAPEUTIC EXERCISES: CPT

## 2019-11-06 NOTE — PROGRESS NOTES
Elle Claire. : 1950  [place]  Secondary: [place] [employer] [address] [facility] at [address], [address] [place] [address],[address], [address]  [phone]   [phone]        OUTPATIENT PHYSICAL THERAPY: Daily Treatment Note 2019  Visit Count:  2    ICD-10: Treatment Diagnosis:   · Muscle weakness (generalized) (M62.81)   Precautions/Allergies:   Amlodipine   TREATMENT PLAN:  Effective Dates: 10/25/2019 TO 2020 (90 days). Frequency/Duration: 2 times a week for 90 Day(s)    Pre-treatment Symptoms/Complaints:  2019: Patient reports his hand is doing better, but he still has some numbness in his armpit. Pain: Initial: Pain Intensity 1: 5  Pain Location 1: Arm, Hand  Pain Orientation 1: Right  Post Session:  0/10   Medications Last Reviewed:  2019  Updated Objective Findings:  None Today  TREATMENT:     THERAPEUTIC EXERCISE: (45 minutes):  Exercises per grid below to improve mobility and strength. Required minimal visual, verbal and manual cues to promote proper body alignment, promote proper body posture and promote proper body mechanics. Progressed resistance, range, repetitions and complexity of movement as indicated. Date:  2019   Activity/Exercise Parameters   UBE 6 minutes  Level 3   Theraband rows Green t-band  20 reps   Theraband pulls Green t-band  20 reps   Wall push-ups 15 reps   Bent over rows 5 pounds  15 reps  B UE   Scaption 5 pounds  15 reps  B UE   Pectoralis stretch in doorway 10 second hold  3 reps   Ulnar nerve stretch 10 second hold  3 reps  B UE      Treatment/Session Summary:    · Response to Treatment:  Patient completed all activities without complaints of increased pain, but some fatigue with activities.   · Communication/Consultation:  None today  · Equipment provided today:  None today  · Recommendations/Intent for next treatment session: Next visit will focus on improving overall mobility and pain.     Total Treatment Billable Duration:  45 minutes   PT Patient Time In/Time Out  Time In: 1100  Time Out: 503 [facility], PT    Future Appointments   Date Time Provider Tiana Amaro   11/6/2019 11:00 AM Neil Hogan PT [facility] SFE   12/17/2019  2:00 PM Daria Davis MD BSNE BSNE   2/3/2020  9:15 AM [facility] ZENY [facility]

## 2019-11-14 ENCOUNTER — HOSPITAL ENCOUNTER (OUTPATIENT)
Dept: PHYSICAL THERAPY | Age: 69
Discharge: HOME | End: 2019-11-14
Attending: PSYCHIATRY & NEUROLOGY
Payer: MEDICARE

## 2019-11-14 PROCEDURE — 97110 THERAPEUTIC EXERCISES: CPT

## 2019-11-14 NOTE — PROGRESS NOTES
Toby Galvan. : 1950  [place]  Secondary: [place] [employer] [address] at [facility]  [address] [facility], [address] [place] [address],[address], [address]  [phone]   [phone]        OUTPATIENT PHYSICAL THERAPY: Daily Treatment Note 2019  Visit Count:  3    ICD-10: Treatment Diagnosis:   · Muscle weakness (generalized) (M62.81)   Precautions/Allergies:   Amlodipine   TREATMENT PLAN:  Effective Dates: 10/25/2019 TO 2020 (90 days). Frequency/Duration: 2 times a week for 90 Day(s)    Pre-treatment Symptoms/Complaints:  2019: Patient reports he is feeling a little better overall. Pain: Initial: Pain Intensity 1: 4  Pain Location 1: Arm, Hand  Pain Orientation 1: Right  Post Session:  0/10   Medications Last Reviewed:  2019  Updated Objective Findings:  None Today  TREATMENT:     THERAPEUTIC EXERCISE: (40 minutes):  Exercises per grid below to improve mobility and strength. Required minimal visual, verbal and manual cues to promote proper body alignment, promote proper body posture and promote proper body mechanics. Progressed resistance, range, repetitions and complexity of movement as indicated.    Date:  2019   Activity/Exercise Parameters   UBE 6 minutes  Level 3   Theraband rows Green t-band  20 reps   Theraband pulls Green t-band  20 reps   Wall push-ups 15 reps   Bicep curls 5 pounds  15 reps  B UE   Bent over rows 5 pounds  15 reps  B UE   Scaption 5 pounds  15 reps  B UE   Pectoralis stretch in doorway 10 second hold  3 reps   Ulnar nerve stretch 10 second hold  3 reps  B UE   Wall pulleys for stretch 5 second hold  10 reps  B UE   Wall ladder for stretch 5 second hold  10 reps  R UE   Nautilus leg press 35 pounds  15 reps    Red   15 reps  R UE      Treatment/Session Summary:    · Response to Treatment:  Patient completed all activities without complaints of increased pain, but some fatigue with activities. · Communication/Consultation:  None today  · Equipment provided today:  None today  · Recommendations/Intent for next treatment session: Next visit will focus on improving overall mobility and pain.     Total Treatment Billable Duration:  40 minutes   PT Patient Time In/Time Out  Time In: 1100  Time Out: 503 [facility], PT    Future Appointments   Date Time Provider Tiana Amaro   11/14/2019 11:00 AM Meet Vargas PT [facility] SFE   11/21/2019 10:30 AM [facility] US LOGIC 7 SFERUS SFE   12/17/2019  2:00 PM Kathy Bonner MD [facility]   2/3/2020  9:15 AM [facility]SA [facility]

## 2019-11-21 ENCOUNTER — HOSPITAL ENCOUNTER (OUTPATIENT)
Dept: ULTRASOUND IMAGING | Age: 69
Discharge: HOME | End: 2019-11-21
Attending: NURSE PRACTITIONER
Payer: MEDICARE

## 2019-11-21 DIAGNOSIS — Z13.6 SCREENING FOR CARDIOVASCULAR CONDITION: ICD-10-CM

## 2019-11-21 PROCEDURE — 76706 US ABDL AORTA SCREEN AAA: CPT

## 2019-11-26 ENCOUNTER — APPOINTMENT (RX ONLY)
Dept: URBAN - METROPOLITAN AREA CLINIC 349 | Facility: CLINIC | Age: 69
Setting detail: DERMATOLOGY
End: 2019-11-26

## 2019-11-26 DIAGNOSIS — L81.4 OTHER MELANIN HYPERPIGMENTATION: ICD-10-CM

## 2019-11-26 DIAGNOSIS — D485 NEOPLASM OF UNCERTAIN BEHAVIOR OF SKIN: ICD-10-CM | Status: STABLE

## 2019-11-26 PROBLEM — D48.5 NEOPLASM OF UNCERTAIN BEHAVIOR OF SKIN: Status: ACTIVE | Noted: 2019-11-26

## 2019-11-26 PROCEDURE — ? COUNSELING

## 2019-11-26 PROCEDURE — ? OTHER

## 2019-11-26 PROCEDURE — ? TREATMENT REGIMEN

## 2019-11-26 PROCEDURE — ? OBSERVATION

## 2019-11-26 PROCEDURE — 99213 OFFICE O/P EST LOW 20 MIN: CPT

## 2019-11-26 ASSESSMENT — LOCATION DETAILED DESCRIPTION DERM
LOCATION DETAILED: LEFT CENTRAL MALAR CHEEK
LOCATION DETAILED: NASAL TIP

## 2019-11-26 ASSESSMENT — LOCATION SIMPLE DESCRIPTION DERM
LOCATION SIMPLE: LEFT CHEEK
LOCATION SIMPLE: NOSE

## 2019-11-26 ASSESSMENT — LOCATION ZONE DERM
LOCATION ZONE: NOSE
LOCATION ZONE: FACE

## 2019-11-26 NOTE — PROCEDURE: OTHER
Detail Level: Zone
Note Text (......Xxx Chief Complaint.): This diagnosis correlates with the
Other (Free Text): Lesion biopsied at present visit. Area appears to be healing well.
Other (Free Text): Patient denies any growth or changes to the area. No suspicious characteristics noted under dermatoscope but lesion hasn’t resolved over this past month. Offered to biopsy lesion or observe again in four months to rule out possibly of basal cell carcinoma. Patient is okay with observing area.

## 2019-12-03 ENCOUNTER — HOSPITAL ENCOUNTER (OUTPATIENT)
Dept: PHYSICAL THERAPY | Age: 69
Discharge: HOME | End: 2019-12-03
Attending: PSYCHIATRY & NEUROLOGY
Payer: MEDICARE

## 2019-12-03 PROCEDURE — 97110 THERAPEUTIC EXERCISES: CPT

## 2019-12-03 NOTE — THERAPY DISCHARGE
Bean Hilario Kehr. : 1950  [place]  Secondary: [place] [employer] [address] at [facility]  27[address] [facility], [address] [place] [address],[address], [address] [facility]  [phone]   [phone]          [address] [place] [address] Street Summary 12/3/2019   ICD-10: Treatment Diagnosis:    Muscle weakness (generalized) (M62.81)   Precautions/Allergies:   Amlodipine  MEDICAL/REFERRING DIAGNOSIS:  Brachial plexus disorders [G54.0]   DATE OF ONSET: Chronic  REFERRING PHYSICIAN: Milla Ulloa MD MD Orders: Evaluate and Treat  Return MD Appointment: TBD     ASSESSMENT:  Mr. Josué Shannon presents with improved sensation and pain in R hand/thumb. Patient attended a total of 4 scheduled physical therapy visits including initial evaluation on 10/25/2019. Treatment consisted of stretching and strengthening activities to improve overall mobility and performance with activities of daily living. Patient will not attend any additional physical therapy visits secondary to discharge today. We will be happy to re-assess him with a change in his status and a new order from his doctor. Thank you for the opportunity to serve this patient. PROBLEM LIST (Impacting functional limitations):  1. Decreased Strength  2. Decreased ADL/Functional Activities  3. Increased Pain  4. Decreased Activity Tolerance INTERVENTIONS PLANNED: (Treatment may consist of any combination of the following)  1. Cold  2. Electrical Stimulation  3. Heat  4. Home Exercise Program (HEP)  5. Manual Therapy  6. Neuromuscular Re-education/Strengthening  7. Range of Motion (ROM)  8. Therapeutic Activites  9. Therapeutic Exercise/Strengthening  10. Transcutaneous Electrical Nerve Stimulation (TENS)  11. Ultrasound (US)     GOALS: (Goals have been discussed and agreed upon with patient.)  Short-Term Functional Goals: Time Frame: 30 days  1.  Patient will be independent with home exercise program without exacerbation of symptoms or cueing needed--goal met. Discharge Goals: Time Frame: 90 days  1. Patient will be independent with all ADLs with minimal onset of right hand/thumb pain pain and no deficits with daily tasks--goal met. 2. Patient will report no fear avoidance with social or recreational activities due to right hand/thumb pain --goal met. 3. Patient will score less than or equal to 12/55 on Quick DASH with minimal effect of right hand/thumb pain on patient's ability to manage every day life activities--goal met. OUTCOME MEASURE:   Tool Used: Disabilities of the Arm, Shoulder and Hand (DASH) Questionnaire - Quick Version  Score:  Initial:   Most Recent:  (Date: 12/3/2019 )   Interpretation of Score: The DASH is designed to measure the activities of daily living in person's with upper extremity dysfunction or pain. Each section is scored on a 1-5 scale, 5 representing the greatest disability. The scores of each section are added together for a total score of 55. Total Duration:  PT Patient Time In/Time Out  Time In: 30  Time Out: 1015     EXAMINATION:   Observation/Orthostatic Postural Assessment:          Posture Assessment: Rounded shoulders, Forward head   Palpation:          Tender to palpation noted along thenar eminence and musculature of right hand. ROM:          R UE Assessment (AROM):  · Shoulder Flexion: 180 degrees  · Shoulder Extension: 10 degrees  · Shoulder Abduction: 170 degrees  · Shoulder Adduction: 0 degrees  · Shoulder Internal rotation: 70 degrees  · Shoulder External rotation: 70 degrees  · Elbow Flexion: 120 degrees  · Elbow Extension: 0 degrees        Cervical Assessment (AROM):  · Flexion: 90% of full AROM  · Extension: 90% of full AROM  · Right side bendin% of full AROM  · Left side bending[de-identified] 90% of full AROM  · Right rotation: 90% of full AROM  · Left rotation: 90% of full AROM  Strength:          R UE Assessment (Strength):   · Shoulder Flexion: 4/5 with manual muscle testing  · Shoulder Extension: 4/5 with manual muscle testing  · Shoulder Abduction: 4/5 with manual muscle testing  · Shoulder Adduction: 4/5 with manual muscle testing  · Shoulder Internal rotation: 4/5 with manual muscle testing  · Shoulder External rotation: 4/5 with manual muscle testing  · Elbow Flexion: 4/5 with manual muscle testing  · Elbow Extension: 4/5 with manual muscle testing        Cervical Assessment (Strength):  · Grossly 4/5 with manual muscle testing  Special Tests:          Negative Hautant's test. Negative Cranial-[place] lift test. Negative [place]-Axis Sheer test. Negative Alar Ligament test. Negative Tectorial Membrane test.   Neurological Screen:        Dermatomes:   Within normal limits        Reflexes:  2+  Functional Mobility:         Gait/Mobility:    ·   Independent         Transfers:     · Sit to Stand: Independent  · Stand to Sit: Independent  · Stand Pivot Transfers: Independent  · Bed to Chair: Independent  · Lateral Transfers: Independent         Bed Mobility:     · Rolling: Independent  · Supine to Sit: Independent  · Sit to Supine: Independent  · Scooting: Independent

## 2019-12-03 NOTE — PROGRESS NOTES
Rosendo Camejo [facility]. : 1950  [place]  Secondary: [place] [employer] [id number] at [facility]  [address], [address] [place] [address],[address], [address] [facility]  [phone]   [phone]        OUTPATIENT PHYSICAL THERAPY: Daily Treatment Note 12/3/2019  Visit Count:  4    ICD-10: Treatment Diagnosis:   · Muscle weakness (generalized) (M62.81)   Precautions/Allergies:   Amlodipine   TREATMENT PLAN:  Effective Dates: 10/25/2019 TO 2020 (90 days). Frequency/Duration: 2 times a week for 90 Day(s)    Pre-treatment Symptoms/Complaints:  12/3/2019: Patient reports he is doing well and feeling better overall. Pain: Initial: Pain Intensity 1: 4  Pain Location 1: Arm, Hand  Pain Orientation 1: Right  Post Session:  0/10   Medications Last Reviewed:  12/3/2019  Updated Objective Findings:  None Today  TREATMENT:     THERAPEUTIC EXERCISE: (40 minutes):  Exercises per grid below to improve mobility and strength. Required minimal visual, verbal and manual cues to promote proper body alignment, promote proper body posture and promote proper body mechanics. Progressed resistance, range, repetitions and complexity of movement as indicated. Date:  12/3/2019   Activity/Exercise Parameters   UBE 6 minutes  Level 3   Theraband rows Green t-band  20 reps   Theraband pulls Green t-band  20 reps   Wall push-ups 15 reps   Bicep curls 5 pounds  15 reps  B UE   Bent over rows 5 pounds  15 reps  B UE   Scaption 5 pounds  15 reps  B UE   Pectoralis stretch in doorway 10 second hold  3 reps   Ulnar nerve stretch 10 second hold  3 reps  B UE   Wall pulleys for stretch 5 second hold  10 reps  B UE   Wall ladder for stretch 5 second hold  10 reps  R UE   Nautilus chest press 35 pounds  15 reps    Red   15 reps  R UE      Treatment/Session Summary:    · Response to Treatment:  Patient completed all activities with improved mobility. Discharge today.     Total Treatment Billable Duration:  40 minutes   PT Patient Time In/Time Out  Time In: 0930  Time Out: [address], PT    Future Appointments   Date Time Provider Tiana Amaro   12/3/2019  9:30 AM Emily Machado PT [facility]   12/17/2019  2:00 PM Fay Singletary MD [facility]   2/3/2020  9:15 AM [facility]

## 2019-12-10 NOTE — PROGRESS NOTES
Aorta ok. 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 Consider recheck in 6-12 months to establish stability.

## 2019-12-17 PROBLEM — R20.0 NUMBNESS AND TINGLING OF RIGHT ARM: Status: ACTIVE | Noted: 2019-12-17

## 2019-12-17 PROBLEM — R20.2 NUMBNESS AND TINGLING OF RIGHT ARM: Status: ACTIVE | Noted: 2019-12-17

## 2019-12-17 PROBLEM — R22.31 AXILLARY MASS, RIGHT: Status: RESOLVED | Noted: 2019-01-05 | Resolved: 2019-12-17

## 2019-12-17 PROBLEM — M79.644 THUMB PAIN, RIGHT: Status: ACTIVE | Noted: 2019-12-17

## 2020-03-26 ENCOUNTER — APPOINTMENT (RX ONLY)
Dept: URBAN - METROPOLITAN AREA CLINIC 349 | Facility: CLINIC | Age: 70
Setting detail: DERMATOLOGY
End: 2020-03-26

## 2020-03-26 DIAGNOSIS — D485 NEOPLASM OF UNCERTAIN BEHAVIOR OF SKIN: ICD-10-CM | Status: STABLE

## 2020-03-26 PROBLEM — D48.5 NEOPLASM OF UNCERTAIN BEHAVIOR OF SKIN: Status: ACTIVE | Noted: 2020-03-26

## 2020-03-26 PROCEDURE — ? COUNSELING

## 2020-03-26 PROCEDURE — 99213 OFFICE O/P EST LOW 20 MIN: CPT

## 2020-03-26 PROCEDURE — ? OTHER

## 2020-03-26 ASSESSMENT — LOCATION DETAILED DESCRIPTION DERM: LOCATION DETAILED: NASAL TIP

## 2020-03-26 ASSESSMENT — LOCATION SIMPLE DESCRIPTION DERM: LOCATION SIMPLE: NOSE

## 2020-03-26 ASSESSMENT — LOCATION ZONE DERM: LOCATION ZONE: NOSE

## 2020-03-26 NOTE — PROCEDURE: OTHER
Other (Free Text): Patient is here to have lesion on his nose observed. Patient denies any changes and states he is unsure where the spot was. Mildred observed and compared to photo. Lesion appears stable.\\n\\nCall if it starts to change or bleeds
Note Text (......Xxx Chief Complaint.): This diagnosis correlates with the
Detail Level: Zone

## 2020-09-16 PROBLEM — E78.49 OTHER HYPERLIPIDEMIA: Status: ACTIVE | Noted: 2017-02-02

## 2020-09-16 PROBLEM — K21.9 GERD (GASTROESOPHAGEAL REFLUX DISEASE): Status: ACTIVE | Noted: 2020-09-16

## 2020-09-16 PROBLEM — E11.65 TYPE 2 DIABETES MELLITUS WITH HYPERGLYCEMIA, WITHOUT LONG-TERM CURRENT USE OF INSULIN (HCC): Status: ACTIVE | Noted: 2020-09-16

## 2020-10-19 ENCOUNTER — HOSPITAL ENCOUNTER (OUTPATIENT)
Dept: ULTRASOUND IMAGING | Age: 70
Discharge: HOME | End: 2020-10-19
Attending: STUDENT IN AN ORGANIZED HEALTH CARE EDUCATION/TRAINING PROGRAM

## 2020-10-19 DIAGNOSIS — R79.89 ABNORMAL LFTS: ICD-10-CM

## 2020-10-19 NOTE — PROGRESS NOTES
Please call the patient regarding his imaging result. Abdominal ultrasound shows evidence of fatty liver disease, which would explain the abnormality in your liver enzymes. Continue cholesterol medication and limiting fatty/fried foods as previously discussed.

## 2020-10-19 NOTE — PROGRESS NOTES
This has been fully explained to the patient, who indicates understanding. Per doctor  Abdominal ultrasound shows evidence of fatty liver disease, which would explain the abnormality in your liver enzymes. Continue cholesterol medication and limiting fatty/fried foods as previously discussed.

## 2020-10-22 PROBLEM — E11.9 CONTROLLED TYPE 2 DIABETES MELLITUS WITHOUT COMPLICATION, WITHOUT LONG-TERM CURRENT USE OF INSULIN (HCC): Status: ACTIVE | Noted: 2020-09-16

## 2020-10-22 PROBLEM — K75.81 NONALCOHOLIC STEATOHEPATITIS (NASH): Status: ACTIVE | Noted: 2020-10-22

## 2020-10-22 PROBLEM — E11.65 TYPE 2 DIABETES MELLITUS WITH HYPERGLYCEMIA, WITHOUT LONG-TERM CURRENT USE OF INSULIN (HCC): Status: RESOLVED | Noted: 2020-09-16 | Resolved: 2020-10-22

## 2021-10-04 PROBLEM — I45.10 RBBB: Status: ACTIVE | Noted: 2021-10-04

## 2022-03-18 PROBLEM — I51.7 CARDIOMEGALY: Status: ACTIVE | Noted: 2017-02-02

## 2022-03-18 PROBLEM — R20.2 NUMBNESS AND TINGLING OF RIGHT ARM: Status: ACTIVE | Noted: 2019-12-17

## 2022-03-18 PROBLEM — I25.2 HISTORY OF MI (MYOCARDIAL INFARCTION): Status: ACTIVE | Noted: 2019-02-11

## 2022-03-18 PROBLEM — R20.0 NUMBNESS AND TINGLING OF RIGHT ARM: Status: ACTIVE | Noted: 2019-12-17

## 2022-03-19 PROBLEM — M79.644 THUMB PAIN, RIGHT: Status: ACTIVE | Noted: 2019-12-17

## 2022-03-19 PROBLEM — R94.31 ABNORMAL EKG: Status: ACTIVE | Noted: 2017-02-02

## 2022-03-19 PROBLEM — E66.01 SEVERE OBESITY (HCC): Status: ACTIVE | Noted: 2019-02-11

## 2022-03-19 PROBLEM — R06.09 DYSPNEA ON EXERTION: Status: ACTIVE | Noted: 2017-02-02

## 2022-03-19 PROBLEM — I49.3 VENTRICULAR BEAT, PREMATURE: Status: ACTIVE | Noted: 2017-02-02

## 2022-03-19 PROBLEM — R53.1 WEAKNESS: Status: ACTIVE | Noted: 2019-08-23

## 2022-03-19 PROBLEM — K75.81 NASH (NONALCOHOLIC STEATOHEPATITIS): Status: ACTIVE | Noted: 2020-10-22

## 2022-03-19 PROBLEM — E11.9 CONTROLLED TYPE 2 DIABETES MELLITUS WITHOUT COMPLICATION, WITHOUT LONG-TERM CURRENT USE OF INSULIN (HCC): Status: ACTIVE | Noted: 2020-09-16

## 2022-03-19 PROBLEM — K21.9 GERD (GASTROESOPHAGEAL REFLUX DISEASE): Status: ACTIVE | Noted: 2020-09-16

## 2022-03-19 PROBLEM — M18.10 ARTHRITIS OF CARPOMETACARPAL (CMC) JOINT OF THUMB: Status: ACTIVE | Noted: 2019-08-23

## 2022-03-19 PROBLEM — R20.2 PARESTHESIA: Status: ACTIVE | Noted: 2019-08-23

## 2022-03-19 PROBLEM — R09.02 HYPOXEMIA: Status: ACTIVE | Noted: 2018-01-02

## 2022-03-20 PROBLEM — E78.49 OTHER HYPERLIPIDEMIA: Status: ACTIVE | Noted: 2017-02-02

## 2022-03-20 PROBLEM — I45.10 RBBB: Status: ACTIVE | Noted: 2021-10-04

## 2022-03-20 PROBLEM — Z99.89 OSA ON CPAP: Status: ACTIVE | Noted: 2017-07-17

## 2022-03-20 PROBLEM — G47.33 OSA ON CPAP: Status: ACTIVE | Noted: 2017-07-17

## 2022-09-18 NOTE — PROGRESS NOTES
Duane Paci Gene Deretha Sequin. (:  1950) is a 70 y.o. male,Established patient, here for evaluation of the following chief complaint(s):  Follow-up (Fu on diabetes) and Nausea         ASSESSMENT/PLAN:  1. Coronary artery disease involving native coronary artery of native heart without angina pectoris  Self-reported MI in , uncertain if stents placed at that time  Negative nuclear stress test in   Echo on 2021 with mild concentric LVH, mild hypokinesis of basal inferior wall, EF of 55 to 60%, mildly dilated LA, AV sclerosis with trace AR and MR, mild TR  Continue aspirin, rosuvastatin, metoprolol succinate, losartan  Follow-up with cardiology as scheduled    - metoprolol succinate (TOPROL XL) 100 MG extended release tablet; Take 1 tablet by mouth every evening  Dispense: 90 tablet; Refill: 2  - rosuvastatin (CRESTOR) 20 MG tablet; Take 1 tablet by mouth at bedtime  Dispense: 90 tablet; Refill: 2    2. Essential hypertension  Continue metoprolol succinate and losartan     - CBC with Auto Differential; Future  - Comprehensive Metabolic Panel; Future  - metoprolol succinate (TOPROL XL) 100 MG extended release tablet; Take 1 tablet by mouth every evening  Dispense: 90 tablet; Refill: 2  - losartan (COZAAR) 25 MG tablet; Take 1 tablet by mouth daily TAKE 1 TABLET BY MOUTH ONCE DAILY FOR HIGH BLOOD PRESSURE  Dispense: 90 tablet; Refill: 2    3. PVC's (premature ventricular contractions)  Echo as above  Continue metoprolol succinate  Check magnesium level    - T4, Free; Future  - TSH; Future  - Magnesium; Future  - metoprolol succinate (TOPROL XL) 100 MG extended release tablet; Take 1 tablet by mouth every evening  Dispense: 90 tablet; Refill: 2    4. Dyslipidemia  Recheck lipid panel  Continue rosuvastatin    - Lipid Panel; Future  - T4, Free; Future  - TSH; Future  - rosuvastatin (CRESTOR) 20 MG tablet; Take 1 tablet by mouth at bedtime  Dispense: 90 tablet; Refill: 2    5.  Type 2 diabetes mellitus without complication, without long-term current use of insulin (HCC)  A1c 6.6% on 3/21/2022, recheck in office today 6.1  Check urine microalbumin to creatinine ratio to evaluate for proteinuria  Check fasting blood sugar regularly. Continue metformin    - AMB POC HEMOGLOBIN A1C  - Microalbumin / Creatinine Urine Ratio; Future    6. Obstructive sleep apnea  Sleep study on 7/31/2017 with AHI of 76.7 events per hour with lowest O2 saturation 59%  Continue positive pressure ventilation per sleep medicine    7. CABRERA (nonalcoholic steatohepatitis)  Rare alcohol use  Abdominal ultrasound on 10/19/2020 with moderate diffuse hepatic steatosis  Continue glycemic control, statin therapy    8. Gastroesophageal reflux disease, unspecified whether esophagitis present  Display GI symptoms secondary to uncontrolled reflux/gastritis. Advised to take PPI daily prior to spicy meals    9. Chronic shortness of breath  Cardiac work-up as above  No significant improvement with albuterol inhaler however patient with concurrent allergies and occasional wheezing with temperature/weather changes  Could be due to to decreased exercise tolerance/obesity hypoventilation syndrome  Continue monitoring    10. Prostate cancer screening  Patient agreeable to repeat PSA testing for prostate cancer screening, order placed    - PSA Screening; Future    11. Encounter for vitamin deficiency screening  Check vitamin D level with supplementation dosing based on lab results    - Vitamin D 25 Hydroxy; Future        Return in about 3 months (around 12/22/2022) for fasting labs prior . Subjective   SUBJECTIVE/OBJECTIVE:  Patient is a 19-year-old  male who presents to the office today for follow-up. Patient states for the past 2 to 3 months he has had some intolerance to spicy foods with nausea, vomiting and diarrhea lasting a day or so. Does have some increasing reflux during these episodes.   However admits he only takes Protonix 2 to 3 bilateral low extremities    Neurological:      Mental Status: He is alert. Mental status is at baseline. Psychiatric:         Attention and Perception: Attention normal.         Mood and Affect: Mood and affect normal. Mood is not anxious or depressed. Affect is not tearful. Speech: Speech normal. Speech is not rapid and pressured or slurred. Behavior: Behavior normal. Behavior is not agitated, aggressive or combative. Behavior is cooperative. Thought Content: Thought content normal.         Cognition and Memory: Cognition normal.                An electronic signature was used to authenticate this note.     --aNncy Arcos, DO

## 2022-09-22 ENCOUNTER — OFFICE VISIT (OUTPATIENT)
Dept: INTERNAL MEDICINE CLINIC | Facility: CLINIC | Age: 72
End: 2022-09-22
Payer: MEDICARE

## 2022-09-22 VITALS
OXYGEN SATURATION: 95 % | SYSTOLIC BLOOD PRESSURE: 118 MMHG | HEIGHT: 68 IN | TEMPERATURE: 97.9 F | BODY MASS INDEX: 37.44 KG/M2 | WEIGHT: 247 LBS | HEART RATE: 56 BPM | DIASTOLIC BLOOD PRESSURE: 78 MMHG

## 2022-09-22 DIAGNOSIS — R06.02 CHRONIC SHORTNESS OF BREATH: ICD-10-CM

## 2022-09-22 DIAGNOSIS — I10 ESSENTIAL HYPERTENSION: ICD-10-CM

## 2022-09-22 DIAGNOSIS — E11.9 TYPE 2 DIABETES MELLITUS WITHOUT COMPLICATION, WITHOUT LONG-TERM CURRENT USE OF INSULIN (HCC): ICD-10-CM

## 2022-09-22 DIAGNOSIS — Z12.5 PROSTATE CANCER SCREENING: ICD-10-CM

## 2022-09-22 DIAGNOSIS — I49.3 PVC'S (PREMATURE VENTRICULAR CONTRACTIONS): ICD-10-CM

## 2022-09-22 DIAGNOSIS — E78.5 DYSLIPIDEMIA: ICD-10-CM

## 2022-09-22 DIAGNOSIS — G47.33 OBSTRUCTIVE SLEEP APNEA: ICD-10-CM

## 2022-09-22 DIAGNOSIS — Z13.21 ENCOUNTER FOR VITAMIN DEFICIENCY SCREENING: ICD-10-CM

## 2022-09-22 DIAGNOSIS — K75.81 NASH (NONALCOHOLIC STEATOHEPATITIS): ICD-10-CM

## 2022-09-22 DIAGNOSIS — I25.10 CORONARY ARTERY DISEASE INVOLVING NATIVE CORONARY ARTERY OF NATIVE HEART WITHOUT ANGINA PECTORIS: Primary | ICD-10-CM

## 2022-09-22 DIAGNOSIS — K21.9 GASTROESOPHAGEAL REFLUX DISEASE, UNSPECIFIED WHETHER ESOPHAGITIS PRESENT: ICD-10-CM

## 2022-09-22 LAB — HBA1C MFR BLD: 6.1 %

## 2022-09-22 PROCEDURE — 2022F DILAT RTA XM EVC RTNOPTHY: CPT | Performed by: STUDENT IN AN ORGANIZED HEALTH CARE EDUCATION/TRAINING PROGRAM

## 2022-09-22 PROCEDURE — 1123F ACP DISCUSS/DSCN MKR DOCD: CPT | Performed by: STUDENT IN AN ORGANIZED HEALTH CARE EDUCATION/TRAINING PROGRAM

## 2022-09-22 PROCEDURE — 3017F COLORECTAL CA SCREEN DOC REV: CPT | Performed by: STUDENT IN AN ORGANIZED HEALTH CARE EDUCATION/TRAINING PROGRAM

## 2022-09-22 PROCEDURE — 3046F HEMOGLOBIN A1C LEVEL >9.0%: CPT | Performed by: STUDENT IN AN ORGANIZED HEALTH CARE EDUCATION/TRAINING PROGRAM

## 2022-09-22 PROCEDURE — G8417 CALC BMI ABV UP PARAM F/U: HCPCS | Performed by: STUDENT IN AN ORGANIZED HEALTH CARE EDUCATION/TRAINING PROGRAM

## 2022-09-22 PROCEDURE — 99214 OFFICE O/P EST MOD 30 MIN: CPT | Performed by: STUDENT IN AN ORGANIZED HEALTH CARE EDUCATION/TRAINING PROGRAM

## 2022-09-22 PROCEDURE — 83036 HEMOGLOBIN GLYCOSYLATED A1C: CPT | Performed by: STUDENT IN AN ORGANIZED HEALTH CARE EDUCATION/TRAINING PROGRAM

## 2022-09-22 PROCEDURE — 1036F TOBACCO NON-USER: CPT | Performed by: STUDENT IN AN ORGANIZED HEALTH CARE EDUCATION/TRAINING PROGRAM

## 2022-09-22 PROCEDURE — G8427 DOCREV CUR MEDS BY ELIG CLIN: HCPCS | Performed by: STUDENT IN AN ORGANIZED HEALTH CARE EDUCATION/TRAINING PROGRAM

## 2022-09-22 RX ORDER — ROSUVASTATIN CALCIUM 20 MG/1
20 TABLET, COATED ORAL NIGHTLY
Qty: 90 TABLET | Refills: 2 | Status: SHIPPED | OUTPATIENT
Start: 2022-09-22

## 2022-09-22 RX ORDER — METOPROLOL SUCCINATE 100 MG/1
100 TABLET, EXTENDED RELEASE ORAL EVERY EVENING
Qty: 90 TABLET | Refills: 2 | Status: SHIPPED | OUTPATIENT
Start: 2022-09-22

## 2022-09-22 RX ORDER — LOSARTAN POTASSIUM 25 MG/1
25 TABLET ORAL DAILY
Qty: 90 TABLET | Refills: 2 | Status: SHIPPED | OUTPATIENT
Start: 2022-09-22

## 2022-09-22 ASSESSMENT — PATIENT HEALTH QUESTIONNAIRE - PHQ9
1. LITTLE INTEREST OR PLEASURE IN DOING THINGS: 0
SUM OF ALL RESPONSES TO PHQ QUESTIONS 1-9: 0
SUM OF ALL RESPONSES TO PHQ9 QUESTIONS 1 & 2: 0
SUM OF ALL RESPONSES TO PHQ QUESTIONS 1-9: 0
2. FEELING DOWN, DEPRESSED OR HOPELESS: 0

## 2022-09-22 ASSESSMENT — ENCOUNTER SYMPTOMS
ANAL BLEEDING: 0
VOMITING: 0
SHORTNESS OF BREATH: 1
DIARRHEA: 0
NAUSEA: 0
TROUBLE SWALLOWING: 0
BLOOD IN STOOL: 0

## 2022-09-30 ENCOUNTER — TELEPHONE (OUTPATIENT)
Dept: PULMONOLOGY | Age: 72
End: 2022-09-30

## 2022-09-30 NOTE — TELEPHONE ENCOUNTER
Spoke with patient and his Velcro is not sticking and informed him to call DME company and he voices understanding.

## 2022-10-01 ENCOUNTER — PATIENT MESSAGE (OUTPATIENT)
Dept: INTERNAL MEDICINE CLINIC | Facility: CLINIC | Age: 72
End: 2022-10-01

## 2022-10-03 NOTE — TELEPHONE ENCOUNTER
From: Slava Shin. To: Dr. Veronika Lundberg  Sent: 10/1/2022 6:04 PM EDT  Subject: LAST MESSAGE    DR Tana Banks; i may have sent the last message too soon. after 2 days of diarrhea,vomiting stomach pain and alot  of and bloating. i started to take 2 protanix and a anti gas pill a day for 3 days. IT appears TO WORKED. it seems you were right. i will continue to take 2 protonix  and anti gas pill for couple of days unless u tell me  different. Franny Leone as for now u can forget referral to gastro. Ashely BALTAZAR FOR UR HELP . will c u at december appt   8 Connie [address]field

## 2022-10-27 NOTE — PROGRESS NOTES
[address] [place] [address] Court, [address] [facility]  [phone]    Patient Name:  Josh Yang. YOB: 1950      Office Visit 10/28/2022    CHIEF COMPLAINT:    Chief Complaint   Patient presents with    Follow-up         HISTORY OF PRESENT ILLNESS:  Patient was diagnosed with sleep apnea in 2017 with AHI 76.7/hr with desaturations to 59%. He is prescribed APAP 9-16 cm using a full face mask. Download reveals 99% compliance over the past year with average nightly use 7 hrs 40 min. AHI is 6.1/hr with average pressure used 11.7-14.8 cm. He is having residual hypopneas. He denies any problems with the pressure. He states that the last headgear he was sent is too loose. The headgear velcro doesn't hold and pops off in the night causing a mask leak. Usually, he notes his events less than 5/hr, but with the headgear, the events have increased. He states that he wasn't eligible for a new headgear so he has been waiting until he is. A sample mask was provided. His current machine is 10years old and needs to be replaced.               Past Medical History:   Diagnosis Date    Anemia     Fatty liver     Former smoker, stopped smoking in distant past     Quit 2007  1 ppd x 20 yrs    GERD (gastroesophageal reflux disease)     Hiatal hernia, Pantoprazole daily, well controlled with meds    Heart attack ([address]     \"pt unsure if stent was placed\" - 2008  @ [address]    Hiatal hernia     Hypercholesterolemia     Hypertension     managed with meds    Lactose intolerance     MI, old     \"pt unsure if stent was placed\" - 2008  @ S    Morbid obesity ([address] 02/21/2019    BMI 41.14    ARCHANA (obstructive sleep apnea) 7/17/2017    uses CPAP    Type 2 diabetes mellitus ([address]          Patient Active Problem List   Diagnosis    Numbness and tingling of right arm    History of MI (myocardial infarction)    Cardiomegaly    Severe obesity ([address]    Essential hypertension    Abnormal EKG    Arthritis of carpometacarpal (CMC) joint of thumb    Paresthesia    Weakness    Hypoxemia    Thumb pain, right    CABRERA (nonalcoholic steatohepatitis)    Controlled type 2 diabetes mellitus without complication, without long-term current use of insulin (HCC)    GERD (gastroesophageal reflux disease)    Ventricular beat, premature    Dyspnea on exertion    Iron deficiency anemia    CAD (coronary artery disease)    Other hyperlipidemia    RBBB    ARCHANA on CPAP          Past Surgical History:   Procedure Laterality Date    APPENDECTOMY  2018    CARDIAC CATHETERIZATION          COLONOSCOPY      about  Dr. Salma He  2017    right axillary     UPPER GASTROINTESTINAL ENDOSCOPY      Dr. Tyron Pan History     Socioeconomic History    Marital status:      Spouse name: Not on file    Number of children: Not on file    Years of education: Not on file    Highest education level: Not on file   Occupational History    Not on file   Tobacco Use    Smoking status: Former     Packs/day: 1.00     Types: Cigarettes     Quit date: 2005     Years since quittin.9    Smokeless tobacco: Never   Substance and Sexual Activity    Alcohol use:  Yes     Alcohol/week: 4.0 standard drinks    Drug use: No    Sexual activity: Not on file   Other Topics Concern    Not on file   Social History Narrative    Not on file     Social Determinants of Health     Financial Resource Strain: Not on file   Food Insecurity: Not on file   Transportation Needs: Not on file   Physical Activity: Not on file   Stress: Not on file   Social Connections: Not on file   Intimate Partner Violence: Not on file   Housing Stability: Not on file         Family History   Problem Relation Age of Onset    Heart Attack Paternal Uncle     Heart Attack Paternal Grandfather 27    Stroke Mother     Alzheimer's Disease Mother     Hypertension Brother     Hypertension Brother     Cancer Sister     Drug Abuse Sister     Heart Disease Sister         thought to be secondary to drugs     Heart Disease Father         pacemaker     No Known Problems Sister     Heart Disease Brother          Allergies   Allergen Reactions    Amlodipine Palpitations         Current Outpatient Medications   Medication Sig    metoprolol succinate (TOPROL XL) 100 MG extended release tablet Take 1 tablet by mouth every evening    losartan (COZAAR) 25 MG tablet Take 1 tablet by mouth daily TAKE 1 TABLET BY MOUTH ONCE DAILY FOR HIGH BLOOD PRESSURE    rosuvastatin (CRESTOR) 20 MG tablet Take 1 tablet by mouth at bedtime    TURMERIC PO Take 1 capsule by mouth daily    aspirin 81 MG EC tablet Take 81 mg by mouth daily    cyanocobalamin 1000 MCG tablet Take 5,000 mcg by mouth daily    fluticasone (FLONASE) 50 MCG/ACT nasal spray 2 sprays by Nasal route daily as needed    magnesium oxide (MAG-OX) 400 (240 Mg) MG tablet Take 400 mg by mouth daily    metFORMIN (GLUCOPHAGE) 500 MG tablet Take 500 mg by mouth daily (with breakfast)    pantoprazole (PROTONIX) 40 MG tablet Take 40 mg by mouth    sildenafil (VIAGRA) 100 MG tablet Take 1/2 to one whole tablet thirty to sixty minutes prior to intercourse as needed for ED    vitamin E 400 UNIT capsule Take 400 Units by mouth daily     No current facility-administered medications for this visit. REVIEW OF SYSTEMS:   CONSTITUTIONAL:   There is no history of fever, chills, night sweats, weight loss, weight gain, persistent fatigue, or lethargy/hypersomnolence. CARDIAC:   No chest pain, pressure, discomfort, palpitations, orthopnea, murmurs, or edema. GI:   No dysphagia, heartburn reflux, nausea/vomiting, diarrhea, abdominal pain, or bleeding. NEURO:   There is no history of AMS, persistent headache, decreased level of consciousness, seizures, or motor or sensory deficits.       PHYSICAL EXAM:    Vitals:    10/28/22 1134   BP: (!) 138/92   Pulse: 72   Resp: 18   Temp: 97.2 °F (36.2 °C)   SpO2: 97% Weight: 253 lb 3.2 oz (114.9 kg)   Height: 5' 8\" (1.727 m)      BMI 38.50        GENERAL APPEARANCE:   The patient is obese and in no respiratory distress. HEENT:   PERRL. Conjunctivae unremarkable. Nasal mucosa is without epistaxis, exudate, or polyps. Gums and dentition are unremarkable. There is no oropharyngeal narrowing. TMs are clear. NECK/LYMPHATIC:   Symmetrical with no elevation of jugular venous pulsation. Trachea midline. No thyroid enlargement. No cervical adenopathy. LUNGS:   Normal respiratory effort with symmetrical lung expansion. Breath sounds clear bilaterally . HEART:   There is a regular rate and rhythm. No murmur, rub, or gallop. There is no edema in the lower extremities. ABDOMEN:   Soft and non-tender. No hepatosplenomegaly. Bowel sounds are normal.     NEURO:   The patient is alert and oriented to person, place, and time. Memory appears intact and mood is normal.  No gross sensorimotor deficits are present. ASSESSMENT:  (Medical Decision Making)      Diagnosis Orders   1. ARCHANA on CPAP  DME - [facility]   Patient is using and benefiting from pap therapy. He feels the headgear on his machine is causing mask leaks. He needs a replacement machine. Change DME to [employer]    2. Severe obesity (HCC)     BMI 38.50         PLAN:  Continue APAP, replacement machine will be ordered  Renew supplies  Follow up will be in 1 year or sooner if needed. If new supplies doesn't improve leak, he will call our office prior to next visit     Orders Placed This Encounter   Procedures    DME - [address] [employer]  Replacement machine, [address] [facility]  Phone: [address] [phone]  Dept: [phone]      Patient Name: Lisa Torres.   : 1950  Gender: male  Address: [address] [place] [address] [address] [address]  Patient phone number: [phone] (home)       Primary Insurance: Payor: MEDICARE / Plan: MEDICARE PART A AND B / Product Type: *No Product type* /   Subscriber ID: [id number] - (Medicare)      AMB Supply Order  Order Details     DME Location:    Order Date: 10/28/2022   The encounter diagnosis was ARCHANA on CPAP. (  X   )New Set-Up     apap machine   (     )[id number] CPAP Unit  (   x  ) Auto CPAP Unit  (     ) BiLevel Unit  (     ) Auto BiLevel Unit  (     ) ASV   (     ) Bilevel ST    (     ) Oxygen Concentrator         Length of need: 12 months    Pressure: 9-16  cmH20  EPR:      Starting Ramp Pressure:   cm H20  Ramp Time: min      Patient had a diagnostic Apnea Hypopnea Index (AHI) of :  76.7    *SUPPLIES* Replace all as needed, or per coverage guidelines     Masks Type:    (  x   ) -Full Face Mask (1 per 3 mon)  ( x    ) -Full Mask (1 per month) Interface/Cushion      (     ) -Nasal Mask (1 per 3 mon)  (     ) - Nasal Mask (1 per month) Interface/Cushion  (     ) -Pillow (2 per mon)  (     ) -Eiyomwcsa (1 per 6 mon)      _________________________________________________________________          Other Supplies:    (  X   )-Owdalvlu (1 per 6 mon)  ( X    )-Voexzk Tubing (1 per 3 mon)  (  X   )- Disposable Filter (2 per mon)  (   X  )-Ippjza Humidifier (1 per year)     (  x   )-Dugfsurzr (sometimes used with Full Face Mask) (1 per 6 mos)  (     )-Tubing-without heat (1 per 3 mos)  ( X   )-Non-Disposable Filter (1 per 6 mos)  (   x  )-Water Chamber (1 per 6 mos)  (     )-Humidifier non-heated (1 per 5 yrs)      Signed Date: 10/28/2022  Electronically Signed By: BRANDI Mensah CNP     No orders of the defined types were placed in this encounter.         Collaborating Physician: Dr. Hailey Bernard    Over 50% of today's office visit was spent in face to face time reviewing test results, prognosis, importance of compliance, education about disease process, benefits of medications, instructions for management of acute flare-ups, and follow up plans. Total face to face time spent with patient was 20  minutes.         BRANDI Bhagat CNP  Electronically signed

## 2022-10-28 ENCOUNTER — OFFICE VISIT (OUTPATIENT)
Dept: SLEEP MEDICINE | Age: 72
End: 2022-10-28
Payer: MEDICARE

## 2022-10-28 VITALS
TEMPERATURE: 97.2 F | RESPIRATION RATE: 18 BRPM | DIASTOLIC BLOOD PRESSURE: 92 MMHG | OXYGEN SATURATION: 97 % | BODY MASS INDEX: 38.37 KG/M2 | HEIGHT: 68 IN | WEIGHT: 253.2 LBS | SYSTOLIC BLOOD PRESSURE: 138 MMHG | HEART RATE: 72 BPM

## 2022-10-28 DIAGNOSIS — E66.01 SEVERE OBESITY (HCC): ICD-10-CM

## 2022-10-28 DIAGNOSIS — G47.33 OSA ON CPAP: Primary | ICD-10-CM

## 2022-10-28 DIAGNOSIS — Z99.89 OSA ON CPAP: Primary | ICD-10-CM

## 2022-10-28 PROCEDURE — 1123F ACP DISCUSS/DSCN MKR DOCD: CPT | Performed by: NURSE PRACTITIONER

## 2022-10-28 PROCEDURE — G8484 FLU IMMUNIZE NO ADMIN: HCPCS | Performed by: NURSE PRACTITIONER

## 2022-10-28 PROCEDURE — G8427 DOCREV CUR MEDS BY ELIG CLIN: HCPCS | Performed by: NURSE PRACTITIONER

## 2022-10-28 PROCEDURE — 3017F COLORECTAL CA SCREEN DOC REV: CPT | Performed by: NURSE PRACTITIONER

## 2022-10-28 PROCEDURE — 3074F SYST BP LT 130 MM HG: CPT | Performed by: NURSE PRACTITIONER

## 2022-10-28 PROCEDURE — 3078F DIAST BP <80 MM HG: CPT | Performed by: NURSE PRACTITIONER

## 2022-10-28 PROCEDURE — G8417 CALC BMI ABV UP PARAM F/U: HCPCS | Performed by: NURSE PRACTITIONER

## 2022-10-28 PROCEDURE — 99213 OFFICE O/P EST LOW 20 MIN: CPT | Performed by: NURSE PRACTITIONER

## 2022-10-28 PROCEDURE — 1036F TOBACCO NON-USER: CPT | Performed by: NURSE PRACTITIONER

## 2022-10-28 ASSESSMENT — SLEEP AND FATIGUE QUESTIONNAIRES
HOW LIKELY ARE YOU TO NOD OFF OR FALL ASLEEP WHILE SITTING INACTIVE IN A PUBLIC PLACE: 0
HOW LIKELY ARE YOU TO NOD OFF OR FALL ASLEEP WHEN YOU ARE A PASSENGER IN A CAR FOR AN HOUR WITHOUT A BREAK: 0
HOW LIKELY ARE YOU TO NOD OFF OR FALL ASLEEP WHILE LYING DOWN TO REST IN THE AFTERNOON WHEN CIRCUMSTANCES PERMIT: 0
HOW LIKELY ARE YOU TO NOD OFF OR FALL ASLEEP WHILE SITTING QUIETLY AFTER LUNCH WITHOUT ALCOHOL: 0
HOW LIKELY ARE YOU TO NOD OFF OR FALL ASLEEP WHILE WATCHING TV: 0
HOW LIKELY ARE YOU TO NOD OFF OR FALL ASLEEP WHILE SITTING AND TALKING TO SOMEONE: 0
HOW LIKELY ARE YOU TO NOD OFF OR FALL ASLEEP WHILE SITTING AND READING: 0
ESS TOTAL SCORE: 0
HOW LIKELY ARE YOU TO NOD OFF OR FALL ASLEEP IN A CAR, WHILE STOPPED FOR A FEW MINUTES IN TRAFFIC: 0

## 2022-10-28 NOTE — PATIENT INSTRUCTIONS
Replacement machine. [facility] to to [facility]. Continue settings. Medicare compliance discussed with patient. Minimally 4 hours nightly, every night, 70% of the time, which is 21/30 days over 4 hours. Our goal is for you to wear the CPAP the entire night's sleep. Follow up in 1 year or sooner if needed  The company who will be taking care of your CPAP supplies is:     Address: [address] [place] [address] [address], [address]  Phone: [phone] Option #1  Fax: [phone]

## 2022-12-12 ENCOUNTER — NURSE ONLY (OUTPATIENT)
Dept: INTERNAL MEDICINE CLINIC | Facility: CLINIC | Age: 72
End: 2022-12-12
Payer: MEDICARE

## 2022-12-12 DIAGNOSIS — Z23 NEED FOR IMMUNIZATION AGAINST INFLUENZA: Primary | ICD-10-CM

## 2022-12-12 PROCEDURE — G0008 ADMIN INFLUENZA VIRUS VAC: HCPCS | Performed by: STUDENT IN AN ORGANIZED HEALTH CARE EDUCATION/TRAINING PROGRAM

## 2022-12-12 PROCEDURE — 90694 VACC AIIV4 NO PRSRV 0.5ML IM: CPT | Performed by: STUDENT IN AN ORGANIZED HEALTH CARE EDUCATION/TRAINING PROGRAM

## 2022-12-15 DIAGNOSIS — I49.3 PVC'S (PREMATURE VENTRICULAR CONTRACTIONS): ICD-10-CM

## 2022-12-15 DIAGNOSIS — I10 ESSENTIAL HYPERTENSION: ICD-10-CM

## 2022-12-15 DIAGNOSIS — Z13.21 ENCOUNTER FOR VITAMIN DEFICIENCY SCREENING: ICD-10-CM

## 2022-12-15 DIAGNOSIS — E78.5 DYSLIPIDEMIA: ICD-10-CM

## 2022-12-15 DIAGNOSIS — E11.9 TYPE 2 DIABETES MELLITUS WITHOUT COMPLICATION, WITHOUT LONG-TERM CURRENT USE OF INSULIN (HCC): ICD-10-CM

## 2022-12-15 DIAGNOSIS — Z12.5 PROSTATE CANCER SCREENING: ICD-10-CM

## 2022-12-15 LAB
25(OH)D3 SERPL-MCNC: 35.2 NG/ML (ref 30–100)
ALBUMIN SERPL-MCNC: 4.2 G/DL (ref 3.2–4.6)
ALBUMIN/GLOB SERPL: 1.2 (ref 0.4–1.6)
ALP SERPL-CCNC: 66 U/L (ref 50–136)
ALT SERPL-CCNC: 32 U/L (ref 12–65)
ANION GAP SERPL CALC-SCNC: 5 MMOL/L (ref 2–11)
AST SERPL-CCNC: 59 U/L (ref 15–37)
BASOPHILS # BLD: 0 K/UL (ref 0–0.2)
BASOPHILS NFR BLD: 1 % (ref 0–2)
BILIRUB SERPL-MCNC: 0.4 MG/DL (ref 0.2–1.1)
BUN SERPL-MCNC: 15 MG/DL (ref 8–23)
CALCIUM SERPL-MCNC: 9 MG/DL (ref 8.3–10.4)
CHLORIDE SERPL-SCNC: 108 MMOL/L (ref 101–110)
CHOLEST SERPL-MCNC: 115 MG/DL
CO2 SERPL-SCNC: 30 MMOL/L (ref 21–32)
CREAT SERPL-MCNC: 1 MG/DL (ref 0.8–1.5)
CREAT UR-MCNC: 180 MG/DL
DIFFERENTIAL METHOD BLD: ABNORMAL
EOSINOPHIL # BLD: 0.1 K/UL (ref 0–0.8)
EOSINOPHIL NFR BLD: 3 % (ref 0.5–7.8)
ERYTHROCYTE [DISTWIDTH] IN BLOOD BY AUTOMATED COUNT: 15.7 % (ref 11.9–14.6)
GLOBULIN SER CALC-MCNC: 3.5 G/DL (ref 2.8–4.5)
GLUCOSE SERPL-MCNC: 111 MG/DL (ref 65–100)
HCT VFR BLD AUTO: 43.2 % (ref 41.1–50.3)
HDLC SERPL-MCNC: 44 MG/DL (ref 40–60)
HDLC SERPL: 2.6
HGB BLD-MCNC: 12.8 G/DL (ref 13.6–17.2)
IMM GRANULOCYTES # BLD AUTO: 0 K/UL (ref 0–0.5)
IMM GRANULOCYTES NFR BLD AUTO: 0 % (ref 0–5)
LDLC SERPL CALC-MCNC: 48 MG/DL
LYMPHOCYTES # BLD: 1.4 K/UL (ref 0.5–4.6)
LYMPHOCYTES NFR BLD: 32 % (ref 13–44)
MAGNESIUM SERPL-MCNC: 2.2 MG/DL (ref 1.8–2.4)
MCH RBC QN AUTO: 25 PG (ref 26.1–32.9)
MCHC RBC AUTO-ENTMCNC: 29.6 G/DL (ref 31.4–35)
MCV RBC AUTO: 84.5 FL (ref 82–102)
MICROALBUMIN UR-MCNC: 1.17 MG/DL (ref 0–3)
MICROALBUMIN/CREAT UR-RTO: 7 MG/G (ref 0–30)
MONOCYTES # BLD: 0.3 K/UL (ref 0.1–1.3)
MONOCYTES NFR BLD: 7 % (ref 4–12)
NEUTS SEG # BLD: 2.5 K/UL (ref 1.7–8.2)
NEUTS SEG NFR BLD: 56 % (ref 43–78)
NRBC # BLD: 0 K/UL (ref 0–0.2)
PLATELET # BLD AUTO: 128 K/UL (ref 150–450)
PMV BLD AUTO: 10.6 FL (ref 9.4–12.3)
POTASSIUM SERPL-SCNC: 4.4 MMOL/L (ref 3.5–5.1)
PROT SERPL-MCNC: 7.7 G/DL (ref 6.3–8.2)
PSA SERPL-MCNC: 0.5 NG/ML
RBC # BLD AUTO: 5.11 M/UL (ref 4.23–5.6)
SODIUM SERPL-SCNC: 143 MMOL/L (ref 133–143)
T4 FREE SERPL-MCNC: 0.9 NG/DL (ref 0.78–1.46)
TRIGL SERPL-MCNC: 115 MG/DL (ref 35–150)
TSH, 3RD GENERATION: 1.21 UIU/ML (ref 0.36–3.74)
VLDLC SERPL CALC-MCNC: 23 MG/DL (ref 6–23)
WBC # BLD AUTO: 4.4 K/UL (ref 4.3–11.1)

## 2022-12-17 NOTE — PROGRESS NOTES
Paxton Roquew Gene Mercedes Montemayor. (:  1950) is a 67 y.o. male,Established patient, here for evaluation of the following chief complaint(s):  Follow-up (Fu on diabetes), Diabetes, and Hypertension         ASSESSMENT/PLAN:  1. Coronary artery disease involving native coronary artery of native heart without angina pectoris  Self-reported MI in , uncertain if stents placed at that time  Negative nuclear stress test in   Echo on 2021 with mild concentric LVH, mild hypokinesis of basal inferior wall, EF of 55 to 60%, mildly dilated LA, AV sclerosis with trace AR and MR, mild TR  Continue aspirin, rosuvastatin, metoprolol succinate, losartan  Follow-up with cardiology as scheduled    2. Essential hypertension  Continue metoprolol succinate and losartan     3. PVC's (premature ventricular contractions)  Echo as above  Potassium and magnesium within normal limits on 12/15/2022  Continue metoprolol succinate    4. Dyslipidemia  Lipid panel from 12/15/2022 reviewed  Continue rosuvastatin    5. Type 2 diabetes mellitus without complication, without long-term current use of insulin ([facility])  A1c 6.1% on 2022, recheck in office today 5.9  Urine microalbumin to creatinine ratio within normal limits on 12/15/2022  Check fasting blood sugar minimum of twice a week. Continue metformin    6. CABRERA (nonalcoholic steatohepatitis)  Rare alcohol use  Abdominal ultrasound on 10/19/2020 with moderate diffuse hepatic steatosis  Continue glycemic control, statin therapy  Follow-up per GI    7. Pancreatic insufficiency  Outpatient GI notes reviewed with findings confirmed on stool studies  Plans for EUS to assess for underlying structural abnormalities  Continue management per specialist.  Given symptoms overall improved, okay to hold off pancreatic enzyme supplementation until after EUS from my standpoint      Return in about 3 months (around 3/22/2023).          Subjective   SUBJECTIVE/OBJECTIVE:  Patient is a 61-year-old  male who presents to the office today for follow-up. Patient has been following with GI after being referred from urgent care for frequent bouts of diarrhea with close to 20 pound weight loss over the past few months. Stool studies were done with findings suggestive of pancreatic insufficiency. He is scheduled for an upper endoscopy on January 11 to evaluate this further. Was recommended to start pancreatic enzyme supplementation but has held off given cost.  No further bouts of diarrhea typically moving his bowels once a day. No melena or hematochezia, nausea or vomiting. Rarely has reflux. Has not required dicyclomine previously prescribed. Has occasional dysphagia with cold liquids but not otherwise. Still has chronic intermittent shortness of breath with climbing steps but no chest pain, cough, leg swelling. Has not been checking his blood sugars at home but denies hypoglycemic symptoms. Blood pressure checks with an automated arm cuff have been in the 120/70's. Review of Systems   HENT:  Positive for trouble swallowing (Occasionally with cold liquids). Respiratory:  Positive for shortness of breath (Chronic, intermittent). Negative for cough. Cardiovascular:  Negative for chest pain and leg swelling. Gastrointestinal:  Negative for abdominal pain, anal bleeding, blood in stool, diarrhea, nausea and vomiting. Genitourinary:  Negative for dysuria and hematuria. Objective   Physical Exam  Vitals reviewed. Constitutional:       General: He is not in acute distress. Appearance: Normal appearance. He is not ill-appearing, toxic-appearing or diaphoretic. HENT:      Head: Normocephalic and atraumatic. Eyes:      General:         Right eye: No discharge. Left eye: No discharge. Extraocular Movements: Extraocular movements intact. Neck:      Vascular: No carotid bruit. Cardiovascular:      Rate and Rhythm: Normal rate and regular rhythm.       Heart sounds: No murmur heard. No friction rub. No gallop. Pulmonary:      Effort: Pulmonary effort is normal. No respiratory distress. Breath sounds: No wheezing, rhonchi or rales. Abdominal:      General: Bowel sounds are normal.      Palpations: Abdomen is soft. Tenderness: There is no abdominal tenderness. There is no guarding. Musculoskeletal:      Right lower leg: No edema. Left lower leg: No edema. Skin:     General: Skin is dry. Coloration: Skin is not jaundiced. Comments: Dry scaling appearing whitish rash along anterior distal shins without erythema   Neurological:      Mental Status: He is alert. Mental status is at baseline. Psychiatric:         Attention and Perception: Attention normal.         Mood and Affect: Mood and affect normal. Mood is not anxious or depressed. Affect is not tearful. Speech: Speech normal. Speech is not rapid and pressured or slurred. Behavior: Behavior normal. Behavior is not agitated, aggressive or combative. Behavior is cooperative. Thought Content: Thought content normal.                An electronic signature was used to authenticate this note.     --Leigh Pradhan, DO

## 2022-12-22 ENCOUNTER — OFFICE VISIT (OUTPATIENT)
Dept: INTERNAL MEDICINE CLINIC | Facility: CLINIC | Age: 72
End: 2022-12-22
Payer: MEDICARE

## 2022-12-22 VITALS
SYSTOLIC BLOOD PRESSURE: 128 MMHG | HEART RATE: 76 BPM | WEIGHT: 241 LBS | BODY MASS INDEX: 36.53 KG/M2 | HEIGHT: 68 IN | TEMPERATURE: 98.1 F | DIASTOLIC BLOOD PRESSURE: 82 MMHG | OXYGEN SATURATION: 95 %

## 2022-12-22 DIAGNOSIS — K86.89 PANCREATIC INSUFFICIENCY: ICD-10-CM

## 2022-12-22 DIAGNOSIS — K75.81 NASH (NONALCOHOLIC STEATOHEPATITIS): ICD-10-CM

## 2022-12-22 DIAGNOSIS — E11.9 TYPE 2 DIABETES MELLITUS WITHOUT COMPLICATION, WITHOUT LONG-TERM CURRENT USE OF INSULIN (HCC): ICD-10-CM

## 2022-12-22 DIAGNOSIS — E78.5 DYSLIPIDEMIA: ICD-10-CM

## 2022-12-22 DIAGNOSIS — I10 ESSENTIAL HYPERTENSION: ICD-10-CM

## 2022-12-22 DIAGNOSIS — I49.3 PVC'S (PREMATURE VENTRICULAR CONTRACTIONS): ICD-10-CM

## 2022-12-22 DIAGNOSIS — I25.10 CORONARY ARTERY DISEASE INVOLVING NATIVE CORONARY ARTERY OF NATIVE HEART WITHOUT ANGINA PECTORIS: Primary | ICD-10-CM

## 2022-12-22 LAB — HBA1C MFR BLD: 5.9 %

## 2022-12-22 PROCEDURE — 83036 HEMOGLOBIN GLYCOSYLATED A1C: CPT | Performed by: STUDENT IN AN ORGANIZED HEALTH CARE EDUCATION/TRAINING PROGRAM

## 2022-12-22 PROCEDURE — 99214 OFFICE O/P EST MOD 30 MIN: CPT | Performed by: STUDENT IN AN ORGANIZED HEALTH CARE EDUCATION/TRAINING PROGRAM

## 2022-12-22 PROCEDURE — G8417 CALC BMI ABV UP PARAM F/U: HCPCS | Performed by: STUDENT IN AN ORGANIZED HEALTH CARE EDUCATION/TRAINING PROGRAM

## 2022-12-22 PROCEDURE — 1036F TOBACCO NON-USER: CPT | Performed by: STUDENT IN AN ORGANIZED HEALTH CARE EDUCATION/TRAINING PROGRAM

## 2022-12-22 PROCEDURE — 3078F DIAST BP <80 MM HG: CPT | Performed by: STUDENT IN AN ORGANIZED HEALTH CARE EDUCATION/TRAINING PROGRAM

## 2022-12-22 PROCEDURE — 1123F ACP DISCUSS/DSCN MKR DOCD: CPT | Performed by: STUDENT IN AN ORGANIZED HEALTH CARE EDUCATION/TRAINING PROGRAM

## 2022-12-22 PROCEDURE — 2022F DILAT RTA XM EVC RTNOPTHY: CPT | Performed by: STUDENT IN AN ORGANIZED HEALTH CARE EDUCATION/TRAINING PROGRAM

## 2022-12-22 PROCEDURE — G8427 DOCREV CUR MEDS BY ELIG CLIN: HCPCS | Performed by: STUDENT IN AN ORGANIZED HEALTH CARE EDUCATION/TRAINING PROGRAM

## 2022-12-22 PROCEDURE — G8484 FLU IMMUNIZE NO ADMIN: HCPCS | Performed by: STUDENT IN AN ORGANIZED HEALTH CARE EDUCATION/TRAINING PROGRAM

## 2022-12-22 PROCEDURE — 3017F COLORECTAL CA SCREEN DOC REV: CPT | Performed by: STUDENT IN AN ORGANIZED HEALTH CARE EDUCATION/TRAINING PROGRAM

## 2022-12-22 PROCEDURE — 3074F SYST BP LT 130 MM HG: CPT | Performed by: STUDENT IN AN ORGANIZED HEALTH CARE EDUCATION/TRAINING PROGRAM

## 2022-12-22 PROCEDURE — 3046F HEMOGLOBIN A1C LEVEL >9.0%: CPT | Performed by: STUDENT IN AN ORGANIZED HEALTH CARE EDUCATION/TRAINING PROGRAM

## 2022-12-22 ASSESSMENT — PATIENT HEALTH QUESTIONNAIRE - PHQ9
SUM OF ALL RESPONSES TO PHQ QUESTIONS 1-9: 0
1. LITTLE INTEREST OR PLEASURE IN DOING THINGS: 0
2. FEELING DOWN, DEPRESSED OR HOPELESS: 0
SUM OF ALL RESPONSES TO PHQ9 QUESTIONS 1 & 2: 0
SUM OF ALL RESPONSES TO PHQ QUESTIONS 1-9: 0

## 2022-12-22 ASSESSMENT — ENCOUNTER SYMPTOMS
DIARRHEA: 0
NAUSEA: 0
ANAL BLEEDING: 0
TROUBLE SWALLOWING: 1
ABDOMINAL PAIN: 0
COUGH: 0
VOMITING: 0
SHORTNESS OF BREATH: 1
BLOOD IN STOOL: 0

## 2022-12-28 ENCOUNTER — PREP FOR PROCEDURE (OUTPATIENT)
Dept: ADMINISTRATIVE | Age: 72
End: 2022-12-28

## 2023-01-03 RX ORDER — FAMOTIDINE 20 MG/1
20 TABLET, FILM COATED ORAL
COMMUNITY

## 2023-01-03 RX ORDER — ASPIRIN 325 MG
325 TABLET ORAL EVERY OTHER DAY
COMMUNITY

## 2023-01-03 NOTE — PERIOP NOTE
Patient name, , and procedure verified.    Type: 1a; abbreviated assessment per anesthesia guidelines    Labs per anesthesia: none    Instructed will receive notificattion the day before procedure by the GI Lab of time of arrival for [facility] cases, and by Pre-op Department for [facility] cases. Arrival times should be called by 5 pm. If no phone is received the patient should contact their respective hospital. The GI lab telephone number is [phone] and ES Pre-op is [phone].     Follow diet and prep instructions per office including NPO status.  If patient has NOT received instructions from office patient is advised to call surgeon office, verbalizes understanding.    Bath or shower the night before and the am of surgery with non-mositurizing soap. No lotions, oils, powders, cologne on skin. No make up, eye make up or jewelry. Wear loose fitting comfortable, clean clothing.     Must have adult present in building the entire time .     TAKE ONLY THE FOLLOWING MEDICATION ON THE DAY OF THE PROCEDURE : pepcid    MEDICATIONS TO HOLD : vitamin e, reduce Aspirin 325 mg to aspirin 81 mg.    The following discharge instructions reviewed : medication given during procedure may cause drowsiness for several hours, therefore, patient must not drive or operate machinery for remainder of the day.Patient may not drink alcohol on the day of your procedure, and should resume regular diet and activity unless otherwise directed. You may experience abdominal distention for several hours that is relieved by the passage of gas. Contact your physician if you have any of the following: fever or chills, severe abdominal pain or excessive amount of bleeding or a large amount when having a bowel movement. Occasional specks of blood with bowel movement would not be unusual.      You may be required to pay a deductible or co-pay on the day of your procedure. You can pre-pay by calling [phone] if your surgery is at the [facility] or  [phone] if your surgery is at the [facility].

## 2023-01-04 RX ORDER — SODIUM CHLORIDE 0.9 % (FLUSH) 0.9 %
5-40 SYRINGE (ML) INJECTION EVERY 12 HOURS SCHEDULED
Status: CANCELLED | OUTPATIENT
Start: 2023-01-04

## 2023-01-04 RX ORDER — SODIUM CHLORIDE 9 MG/ML
INJECTION, SOLUTION INTRAVENOUS PRN
Status: CANCELLED | OUTPATIENT
Start: 2023-01-04

## 2023-01-04 RX ORDER — SODIUM CHLORIDE 0.9 % (FLUSH) 0.9 %
5-40 SYRINGE (ML) INJECTION PRN
Status: CANCELLED | OUTPATIENT
Start: 2023-01-04

## 2023-01-10 ENCOUNTER — ANESTHESIA EVENT (OUTPATIENT)
Dept: ENDOSCOPY | Age: 73
End: 2023-01-10
Payer: MEDICARE

## 2023-01-10 NOTE — PROGRESS NOTES
Correct phone number [phone] obtained form [employer]  voice mail left with arrival time location and  requirements  call back number left

## 2023-01-11 ENCOUNTER — HOSPITAL ENCOUNTER (OUTPATIENT)
Age: 73
Setting detail: OUTPATIENT SURGERY
Discharge: HOME | End: 2023-01-11
Attending: INTERNAL MEDICINE | Admitting: INTERNAL MEDICINE
Payer: MEDICARE

## 2023-01-11 ENCOUNTER — ANESTHESIA (OUTPATIENT)
Dept: ENDOSCOPY | Age: 73
End: 2023-01-11
Payer: MEDICARE

## 2023-01-11 VITALS
SYSTOLIC BLOOD PRESSURE: 106 MMHG | WEIGHT: 240 LBS | RESPIRATION RATE: 18 BRPM | HEIGHT: 70 IN | HEART RATE: 65 BPM | OXYGEN SATURATION: 93 % | DIASTOLIC BLOOD PRESSURE: 55 MMHG | TEMPERATURE: 98.6 F | BODY MASS INDEX: 34.36 KG/M2

## 2023-01-11 LAB
GLUCOSE BLD STRIP.AUTO-MCNC: 100 MG/DL (ref 65–100)
SERVICE CMNT-IMP: NORMAL

## 2023-01-11 PROCEDURE — 2500000003 HC RX 250 WO HCPCS

## 2023-01-11 PROCEDURE — 3700000000 HC ANESTHESIA ATTENDED CARE: Performed by: INTERNAL MEDICINE

## 2023-01-11 PROCEDURE — 82962 GLUCOSE BLOOD TEST: CPT

## 2023-01-11 PROCEDURE — 3700000001 HC ADD 15 MINUTES (ANESTHESIA): Performed by: INTERNAL MEDICINE

## 2023-01-11 PROCEDURE — 2709999900 HC NON-CHARGEABLE SUPPLY: Performed by: INTERNAL MEDICINE

## 2023-01-11 PROCEDURE — 7100000011 HC PHASE II RECOVERY - ADDTL 15 MIN: Performed by: INTERNAL MEDICINE

## 2023-01-11 PROCEDURE — C1753 CATH, INTRAVAS ULTRASOUND: HCPCS | Performed by: INTERNAL MEDICINE

## 2023-01-11 PROCEDURE — 7100000010 HC PHASE II RECOVERY - FIRST 15 MIN: Performed by: INTERNAL MEDICINE

## 2023-01-11 PROCEDURE — 2580000003 HC RX 258: Performed by: STUDENT IN AN ORGANIZED HEALTH CARE EDUCATION/TRAINING PROGRAM

## 2023-01-11 PROCEDURE — 3609018500 HC EGD US SCOPE W/ADJACENT STRUCTURES: Performed by: INTERNAL MEDICINE

## 2023-01-11 PROCEDURE — 6360000002 HC RX W HCPCS

## 2023-01-11 RX ORDER — SODIUM CHLORIDE 0.9 % (FLUSH) 0.9 %
5-40 SYRINGE (ML) INJECTION PRN
Status: DISCONTINUED | OUTPATIENT
Start: 2023-01-11 | End: 2023-01-11 | Stop reason: HOSPADM

## 2023-01-11 RX ORDER — LIDOCAINE HYDROCHLORIDE 20 MG/ML
INJECTION, SOLUTION EPIDURAL; INFILTRATION; INTRACAUDAL; PERINEURAL PRN
Status: DISCONTINUED | OUTPATIENT
Start: 2023-01-11 | End: 2023-01-11 | Stop reason: SDUPTHER

## 2023-01-11 RX ORDER — SODIUM CHLORIDE 0.9 % (FLUSH) 0.9 %
5-40 SYRINGE (ML) INJECTION EVERY 12 HOURS SCHEDULED
Status: DISCONTINUED | OUTPATIENT
Start: 2023-01-11 | End: 2023-01-11 | Stop reason: HOSPADM

## 2023-01-11 RX ORDER — GLYCOPYRROLATE 0.2 MG/ML
INJECTION INTRAMUSCULAR; INTRAVENOUS PRN
Status: DISCONTINUED | OUTPATIENT
Start: 2023-01-11 | End: 2023-01-11 | Stop reason: SDUPTHER

## 2023-01-11 RX ORDER — SODIUM CHLORIDE, SODIUM LACTATE, POTASSIUM CHLORIDE, CALCIUM CHLORIDE 600; 310; 30; 20 MG/100ML; MG/100ML; MG/100ML; MG/100ML
INJECTION, SOLUTION INTRAVENOUS CONTINUOUS
Status: DISCONTINUED | OUTPATIENT
Start: 2023-01-11 | End: 2023-01-11 | Stop reason: HOSPADM

## 2023-01-11 RX ORDER — LIDOCAINE HYDROCHLORIDE 10 MG/ML
1 INJECTION, SOLUTION INFILTRATION; PERINEURAL
Status: DISCONTINUED | OUTPATIENT
Start: 2023-01-11 | End: 2023-01-11 | Stop reason: HOSPADM

## 2023-01-11 RX ORDER — SODIUM CHLORIDE 9 MG/ML
INJECTION, SOLUTION INTRAVENOUS PRN
Status: DISCONTINUED | OUTPATIENT
Start: 2023-01-11 | End: 2023-01-11 | Stop reason: HOSPADM

## 2023-01-11 RX ORDER — PROPOFOL 10 MG/ML
INJECTION, EMULSION INTRAVENOUS PRN
Status: DISCONTINUED | OUTPATIENT
Start: 2023-01-11 | End: 2023-01-11 | Stop reason: SDUPTHER

## 2023-01-11 RX ORDER — PROPOFOL 10 MG/ML
INJECTION, EMULSION INTRAVENOUS CONTINUOUS PRN
Status: DISCONTINUED | OUTPATIENT
Start: 2023-01-11 | End: 2023-01-11 | Stop reason: SDUPTHER

## 2023-01-11 RX ORDER — ONDANSETRON 2 MG/ML
INJECTION INTRAMUSCULAR; INTRAVENOUS PRN
Status: DISCONTINUED | OUTPATIENT
Start: 2023-01-11 | End: 2023-01-11 | Stop reason: SDUPTHER

## 2023-01-11 RX ADMIN — LIDOCAINE HYDROCHLORIDE 100 MG: 20 INJECTION, SOLUTION EPIDURAL; INFILTRATION; INTRACAUDAL; PERINEURAL at 15:18

## 2023-01-11 RX ADMIN — PROPOFOL 20 MG: 10 INJECTION, EMULSION INTRAVENOUS at 15:19

## 2023-01-11 RX ADMIN — ONDANSETRON 4 MG: 2 INJECTION INTRAMUSCULAR; INTRAVENOUS at 15:18

## 2023-01-11 RX ADMIN — PROPOFOL 60 MG: 10 INJECTION, EMULSION INTRAVENOUS at 15:18

## 2023-01-11 RX ADMIN — PROPOFOL 160 MCG/KG/MIN: 10 INJECTION, EMULSION INTRAVENOUS at 15:19

## 2023-01-11 RX ADMIN — PROPOFOL 10 MG: 10 INJECTION, EMULSION INTRAVENOUS at 15:20

## 2023-01-11 RX ADMIN — GLYCOPYRROLATE 0.2 MG: 0.2 INJECTION INTRAMUSCULAR; INTRAVENOUS at 15:18

## 2023-01-11 RX ADMIN — PROPOFOL 20 MG: 10 INJECTION, EMULSION INTRAVENOUS at 15:33

## 2023-01-11 RX ADMIN — SODIUM CHLORIDE, POTASSIUM CHLORIDE, SODIUM LACTATE AND CALCIUM CHLORIDE: 600; 310; 30; 20 INJECTION, SOLUTION INTRAVENOUS at 13:50

## 2023-01-11 ASSESSMENT — PAIN - FUNCTIONAL ASSESSMENT
PAIN_FUNCTIONAL_ASSESSMENT: NONE - DENIES PAIN
PAIN_FUNCTIONAL_ASSESSMENT: 0-10
PAIN_FUNCTIONAL_ASSESSMENT: NONE - DENIES PAIN

## 2023-01-11 NOTE — PROGRESS NOTES
Pt discharged home with his wife. VSS. Discharge instructions reviewed with patient and wife, understanding verbalized, and copy of instructions sent home with patient. All belongings sent home with patient. Patient transported out via wheelchair by [facility], [facility].

## 2023-01-11 NOTE — DISCHARGE INSTRUCTIONS
Gastrointestinal Esophagogastroduodenoscopy (EGD)/ Endoscopic Ultrasound(EUS)- Upper Exam Discharge Instructions    1. Call Dr. Umang Martinez  for any problems or questions. ([phone])  2. Contact the doctor's office for follow up appointment as directed. 3. Medication may cause drowsiness for several hours, therefore, do not drive or operate machinery for remainder of the day. 4. No alcohol today. 5. Do not make any important decisions such as signing legal paperwork. 6. Ordinarily, you may resume regular diet and activity after exam unless otherwise specified by your physician. 7. For mild soreness in your throat you may use Cepacol throat lozenges or warm  salt-water gargles as needed. Any additional instructions:   1. Low fat diet. 2. Start Creon 36 - 2 with meals and 1 with snacks. 3. Strict avoidance of tobacco and alcohol. .  5. Return to office in 2-3 months. Instructions given to Wayne Waynes. and other family members.

## 2023-01-11 NOTE — OP NOTE
Procedure:  Endoscopic ultrasound with Doppler     Date of Procedure:  1/11/2023    Patient:  Debbie Gonsales.     1950    Indication:  Exocrine pancreatic insufficiency    Sedation:  MAC    Pre-Procedure Physical Exam:    Mental status:  alert and oriented  Airway:  normal oropharyngeal airway and neck mobility  CV:  regular rate and rhythm  Respiratory:  clear to auscultation    Procedure:  A History and Physical has been performed, and patient medication allergies have been reviewed. Risks of perforation, hemorrhage, adverse drug reaction, and aspiration were discussed. Informed consent was obtained for the procedure, including sedation. The patient was placed in the left lateral decubitus position. The heart rate, oxygen saturations, blood pressure, and response to care were monitored throughout the procedure. The linear echoendoscope was passed through the mouth and advanced under direct vision to the distal duodenum. As the scope was slowly withdrawn, detailed endoscopic images were obtained from the surrounding organs. Shortly after beginning the procedure, patient desaturated into the 80% range, presumably due to obstructive airway. The scope was removed and bag mask ventilation was employed. The procedure was then resumed with oxygen saturation in the high 90% range but no additional medications were administered for sedation. Findings:     ENDOSCOPIC FINDINGS:  Limited views of the mucosa with the echoendoscope reveals a very large hiatal hernia, but no other gross abnormalities of the stomach or proximal duodenum. PANCREAS:  The pancreas is well-visualized from head to tail. The pancreas is diffusely atrophic with pancreatic parenchymal changes that include fine lobularity, hyperechoic foci and hyperechoic stranding. No cysts or masses are visualized.  The main pancreatic duct has an irregular contour and is dilated, measuring up to 4 mm in the head, 3 mm in the body and 2 mm in the tail. Pancreas divisum is not seen. BILIARY TREE: The gallbladder appears normal. The common bile duct is well-visualized from its insertion in the ampulla to the bifurcation of right and left hepatic ducts. It is non-dilated, measuring up to 6 mm maximally with a gradual taper down to the level of the ampulla. There are no intraductal stones, sludge or debris. The ampulla appears normal endosonographically. OTHER ORGANS:  Views of the left lobe of the liver demonstrate no solid mass lesions. Fatty infiltration of the liver. There is no ascites in the upper abdomen. The left adrenal gland appears normal. The major vascular structures including the portal vein, splenic vessels and celiac artery appear unremarkable. There are no pathologically enlarged posterior mediastinal or upper abdominal lymph nodes. Specimen:  No    Estimated Blood Loss:    Minimal    Implant:  None           Impression:    1. Extensive pancreatic parenchymal abnormalities are consistent with chronic pancreatitis based on EUS criteria. 2. Fatty infiltration of the liver. Plan:  1. Low fat diet. 2. Start Creon 36 - 2 with meals and 1 with snacks. 3. Strict avoidance of tobacco and alcohol. 4. Any further GI procedures should be performed under general anesthesia on mechanical ventilation. 5. Return to office in 2-3 months.     Signed:  Pallavi Fagan MD  1/11/2023  3:47 PM

## 2023-01-11 NOTE — ANESTHESIA PRE PROCEDURE
Department of Anesthesiology  Preprocedure Note       Name:  Hermila Sahni. Age:  67 y.o.  :  1950                                          MRN:  [id number]         Date:  2023      Surgeon: Paxton Mortensen):  Chacorta Rose MD    Procedure: Procedure(s):  ENDOSCOPIC ULTRASOUND W/ EGD  EGD ESOPHAGOGASTRODUODENOSCOPY    Medications prior to admission:   Prior to Admission medications    Medication Sig Start Date End Date Taking?  Authorizing Provider   aspirin 325 MG tablet Take 325 mg by mouth every other day   Yes Historical Provider, MD   famotidine (PEPCID) 20 MG tablet Take 20 mg by mouth every morning (before breakfast)   Yes Historical Provider, MD   metoprolol succinate (TOPROL XL) 100 MG extended release tablet Take 1 tablet by mouth every evening 22   Efren Bilberry, DO   losartan (COZAAR) 25 MG tablet Take 1 tablet by mouth daily TAKE 1 TABLET BY MOUTH ONCE DAILY FOR HIGH BLOOD PRESSURE 22   Efren Bilberry, DO   rosuvastatin (CRESTOR) 20 MG tablet Take 1 tablet by mouth at bedtime 22   Efren Bilberry, DO   cyanocobalamin 1000 MCG tablet Take 5,000 mcg by mouth daily    Ar Automatic Reconciliation   fluticasone (FLONASE) 50 MCG/ACT nasal spray 2 sprays by Nasal route daily as needed 21   Ar Automatic Reconciliation   magnesium oxide (MAG-OX) 400 (240 Mg) MG tablet Take 400 mg by mouth daily 3/21/22   Ar Automatic Reconciliation   metFORMIN (GLUCOPHAGE) 500 MG tablet Take 500 mg by mouth daily (with breakfast) 3/21/22   Ar Automatic Reconciliation   sildenafil (VIAGRA) 100 MG tablet Take 1/2 to one whole tablet thirty to sixty minutes prior to intercourse as needed for ED 3/21/22   Ar Automatic Reconciliation   vitamin E 400 UNIT capsule Take 400 Units by mouth daily    Ar Automatic Reconciliation       Current medications:    Current Facility-Administered Medications   Medication Dose Route Frequency Provider Last Rate Last Admin    lidocaine 1 % injection 1 mL  1 mL IntraDERmal Once PRN Sunday MD Glenn        lactated ringers infusion   IntraVENous Continuous Sunday MD Glenn        sodium chloride flush 0.9 % injection 5-40 mL  5-40 mL IntraVENous 2 times per day Sunday MD Glenn        sodium chloride flush 0.9 % injection 5-40 mL  5-40 mL IntraVENous PRN Sunday MD Glenn        0.9 % sodium chloride infusion   IntraVENous PRN Sunday MD Glenn        sodium chloride flush 0.9 % injection 5-40 mL  5-40 mL IntraVENous 2 times per day Amado Contreras MD        sodium chloride flush 0.9 % injection 5-40 mL  5-40 mL IntraVENous PRN Amado Contreras MD        0.9 % sodium chloride infusion   IntraVENous PRN Amado Contreras MD           Allergies:     Allergies   Allergen Reactions    Amlodipine Palpitations       Problem List:    Patient Active Problem List   Diagnosis Code    Numbness and tingling of right arm R20.0, R20.2    History of MI (myocardial infarction) I25.2    Cardiomegaly I51.7    Severe obesity ([facility] [address] E66.01    Essential hypertension I10    Abnormal EKG R94.31    Arthritis of carpometacarpal (CMC) joint of thumb M18.10    Paresthesia R20.2    Weakness R53.1    Hypoxemia R09.02    Thumb pain, right M79.644    CABRERA (nonalcoholic steatohepatitis) K75.81    Controlled type 2 diabetes mellitus without complication, without long-term current use of insulin (HCC) E11.9    GERD (gastroesophageal reflux disease) K21.9    Ventricular beat, premature I49.3    Dyspnea on exertion R06.09    Iron deficiency anemia D50.9    CAD (coronary artery disease) I25.10    Other hyperlipidemia E78.49    RBBB I45.10    ARCHANA on CPAP G47.33, Z99.89       Past Medical History:        Diagnosis Date    Anemia     only blood transfusion ~2018    Former smoker, stopped smoking in distant past     Quit 2007  1 ppd x 20 yrs    GERD (gastroesophageal reflux disease)     Hiatal hernia, pepcid daily, well controlled    Heart attack ([facility] [address] 2008    \"pt unsure if stent was placed\" -   @ [facility]    Hiatal hernia     Hypercholesterolemia     Hypertension     managed with meds    Lactose intolerance     MI, old       @ [facility]- no stents or interventions    Morbid obesity ([address] 2019    BMI 41.14    CABRERA (nonalcoholic steatohepatitis)     ARCHANA (obstructive sleep apnea) 2017    uses CPAP    Right bundle branch block     Type 2 diabetes mellitus [facility])        Past Surgical History:        Procedure Laterality Date    APPENDECTOMY  2018    CARDIAC CATHETERIZATION      - no intervention    COLONOSCOPY      about  Dr. Tiney Severs BIOPSY  2017    right axillary     SOFT TISSUE MASS EXCISION      Axillary mass    UPPER GASTROINTESTINAL ENDOSCOPY      Dr. Maribell Fu       Social History:    Social History     Tobacco Use    Smoking status: Former     Packs/day: 1.00     Types: Cigarettes     Quit date: 2005     Years since quittin.1    Smokeless tobacco: Never   Substance Use Topics    Alcohol use: Yes     Alcohol/week: 4.0 standard drinks     Types: 2 Cans of beer, 2 Shots of liquor per week                                Counseling given: Not Answered      Vital Signs (Current):   Vitals:    23 1625   Weight: 240 lb (108.9 kg)   Height: 5' 9.5\" (1.765 m)                                              BP Readings from Last 3 Encounters:   22 128/82   10/28/22 (!) 138/92   22 118/78       NPO Status:                                                                                 BMI:   Wt Readings from Last 3 Encounters:   23 240 lb (108.9 kg)   22 241 lb (109.3 kg)   10/28/22 253 lb 3.2 oz (114.9 kg)     Body mass index is 34.93 kg/m².     CBC:   Lab Results   Component Value Date/Time    WBC 4.4 12/15/2022 10:45 AM    RBC 5.11 12/15/2022 10:45 AM    HGB 12.8 12/15/2022 10:45 AM    HCT 43.2 12/15/2022 10:45 AM    MCV 84.5 12/15/2022 10:45 AM    RDW 15.7 12/15/2022 10:45 AM     12/15/2022 10:45 AM       CMP:   Lab Results   Component Value Date/Time     12/15/2022 10:45 AM    K 4.4 12/15/2022 10:45 AM     12/15/2022 10:45 AM    CO2 30 12/15/2022 10:45 AM    BUN 15 12/15/2022 10:45 AM    CREATININE 1.00 12/15/2022 10:45 AM    GFRAA 85 09/13/2021 09:55 AM    AGRATIO 1.8 09/13/2021 09:55 AM    LABGLOM >60 12/15/2022 10:45 AM    GLUCOSE 111 12/15/2022 10:45 AM    PROT 7.7 12/15/2022 10:45 AM    CALCIUM 9.0 12/15/2022 10:45 AM    BILITOT 0.4 12/15/2022 10:45 AM    ALKPHOS 66 12/15/2022 10:45 AM    ALKPHOS 62 09/13/2021 09:55 AM    AST 59 12/15/2022 10:45 AM    ALT 32 12/15/2022 10:45 AM       POC Tests: No results for input(s): POCGLU, POCNA, POCK, POCCL, POCBUN, POCHEMO, POCHCT in the last 72 hours. Coags: No results found for: PROTIME, INR, APTT    HCG (If Applicable): No results found for: PREGTESTUR, PREGSERUM, HCG, HCGQUANT     ABGs: No results found for: PHART, PO2ART, OLT9NYJ, MIV3ION, BEART, W7CYBUYJ     Type & Screen (If Applicable):  No results found for: LABABO, LABRH    Drug/Infectious Status (If Applicable):  No results found for: HIV, HEPCAB    COVID-19 Screening (If Applicable): No results found for: COVID19        Anesthesia Evaluation  Patient summary reviewed and Nursing notes reviewed no history of anesthetic complications:   Airway: Mallampati: III  TM distance: <3 FB   Neck ROM: limited  Mouth opening: > = 3 FB   Dental: normal exam         Pulmonary:normal exam    (+) sleep apnea: on CPAP,                             Cardiovascular:  Exercise tolerance: good (>4 METS),   (+) hypertension:, CAD:, dysrhythmias: PVC, ARNDT:, hyperlipidemia               ROS comment: TTE 2021: · LV: Estimated LVEF is 55 - 60%. Normal cavity size, systolic function (ejection fraction normal) and diastolic function. Mild concentric hypertrophy. Mild hypokinesis of the basal inferior wall(s). · TV: Mild tricuspid valve regurgitation is present.     RBBB     Neuro/Psych: Negative Neuro/Psych ROS              GI/Hepatic/Renal:   (+) GERD:, liver disease:,           Endo/Other:    (+) DiabetesType II DM, , .                 Abdominal:             Vascular: negative vascular ROS. Other Findings:           Anesthesia Plan      TIVA     ASA 3       Induction: intravenous. Anesthetic plan and risks discussed with patient.                         Zaynab Ellington MD   1/11/2023

## 2023-01-11 NOTE — H&P
History and Physical for Endoscopic Ultrasound             Date: 2023     History of Present Illness:  Patient presents to undergo endoscopic ultrasound. Past Medical History:   Diagnosis Date    Anemia     only blood transfusion ~2018    Former smoker, stopped smoking in distant past     Quit   1 ppd x 20 yrs    GERD (gastroesophageal reflux disease)     Hiatal hernia, pepcid daily, well controlled    Heart attack [facility])     \"pt unsure if stent was placed\" -   @ [facility]    Hiatal hernia     Hypercholesterolemia     Hypertension     managed with meds    Lactose intolerance     MI, old       @ [facility]- no stents or interventions    Morbid obesity ([address] 2019    BMI 41.14    CABRERA (nonalcoholic steatohepatitis)     ARCHANA (obstructive sleep apnea) 2017    uses CPAP    Right bundle branch block     Type 2 diabetes mellitus ([facility]      Past Surgical History:   Procedure Laterality Date    APPENDECTOMY  2018    CARDIAC CATHETERIZATION      2008- no intervention    COLONOSCOPY      about  Dr. Enzo Martinez  2017    right axillary     SOFT TISSUE MASS EXCISION  2019    Axillary mass    UPPER GASTROINTESTINAL ENDOSCOPY      Dr. Pauline Yi      Family History   Problem Relation Age of Onset    Heart Attack Paternal Uncle     Heart Attack Paternal Grandfather 27    Stroke Mother     Alzheimer's Disease Mother     Hypertension Brother     Hypertension Brother     Cancer Sister     Drug Abuse Sister     Heart Disease Sister         thought to be secondary to drugs     Heart Disease Father         pacemaker     No Known Problems Sister     Heart Disease Brother      Social History     Tobacco Use    Smoking status: Former     Packs/day: 1.00     Types: Cigarettes     Quit date: 2005     Years since quittin.1    Smokeless tobacco: Never   Substance Use Topics    Alcohol use:  Yes     Alcohol/week: 4.0 standard drinks     Types: 2 Cans of beer, 2 Shots of liquor per week        Allergies   Allergen Reactions    Amlodipine Palpitations     No current facility-administered medications for this encounter. Current Outpatient Medications   Medication Sig    aspirin 325 MG tablet Take 325 mg by mouth every other day    famotidine (PEPCID) 20 MG tablet Take 20 mg by mouth every morning (before breakfast)    metoprolol succinate (TOPROL XL) 100 MG extended release tablet Take 1 tablet by mouth every evening    losartan (COZAAR) 25 MG tablet Take 1 tablet by mouth daily TAKE 1 TABLET BY MOUTH ONCE DAILY FOR HIGH BLOOD PRESSURE    rosuvastatin (CRESTOR) 20 MG tablet Take 1 tablet by mouth at bedtime    cyanocobalamin 1000 MCG tablet Take 5,000 mcg by mouth daily    fluticasone (FLONASE) 50 MCG/ACT nasal spray 2 sprays by Nasal route daily as needed    magnesium oxide (MAG-OX) 400 (240 Mg) MG tablet Take 400 mg by mouth daily    metFORMIN (GLUCOPHAGE) 500 MG tablet Take 500 mg by mouth daily (with breakfast)    sildenafil (VIAGRA) 100 MG tablet Take 1/2 to one whole tablet thirty to sixty minutes prior to intercourse as needed for ED    vitamin E 400 UNIT capsule Take 400 Units by mouth daily        Review of Systems:  A detailed 10 organ review of systems is obtained with pertinent positives as listed in the History of Present Illness. All others are negative. Objective:     Physical Exam:  Ht 5' 9.5\" (1.765 m)   Wt 240 lb (108.9 kg)   BMI 34.93 kg/m²    General:  Alert and oriented. Heart: Regular rate and rhythm  Lungs:  Clear to auscultation bilaterally  Abdomen: Soft, nontender, nondistended    Impression/Plan:     Proceed with EUS as planned. I have discussed with the patient the technique, benefits, alternatives, and risks of the procedure, including medication reaction, immediate or delayed bleeding, or perforation of the gastrointestinal tract.     Signed By: Pallavi Fagan MD     January 11, 2023

## 2023-01-12 NOTE — ANESTHESIA POSTPROCEDURE EVALUATION
Department of Anesthesiology  Postprocedure Note    Patient: Lanette Hutton   MRN: [id number]  YOB: 1950  Date of evaluation: 1/12/2023      Procedure Summary     Date: 01/11/23 Room / Location: [facility] ENDO 05 / [facility] ENDOSCOPY    Anesthesia Start: 1515 Anesthesia Stop: 1604    Procedure: ENDOSCOPIC ULTRASOUND W/ EGD (Upper GI Region) Diagnosis:       Change in bowel habits      (Change in bowel habits [R19.4])    Surgeons: Natalie Avendaño MD Responsible Provider: Dakota Borjas MD    Anesthesia Type: MAC, TIVA ASA Status: 3          Anesthesia Type: MAC, TIVA    Khadijah Phase I: Khadijah Score: 10    Khadijah Phase II: Khadijah Score: 10      Anesthesia Post Evaluation    Patient location during evaluation: PACU  Patient participation: complete - patient participated  Level of consciousness: awake and alert  Airway patency: patent  Nausea & Vomiting: no nausea and no vomiting  Complications: no  Cardiovascular status: hemodynamically stable  Respiratory status: acceptable, nonlabored ventilation and spontaneous ventilation  Hydration status: euvolemic  Comments: BP (!) 106/55   Pulse 65   Temp 98.6 °F (37 °C) (Temporal) Comment: GI RR  Resp 18   Ht 5' 9.5\" (1.765 m)   Wt 240 lb (108.9 kg)   SpO2 93%   BMI 34.93 kg/m²     Multimodal analgesia pain management approach

## 2023-03-19 NOTE — PROGRESS NOTES
Affect is not tearful. Speech: Speech normal. Speech is not rapid and pressured or slurred. Behavior: Behavior normal. Behavior is not agitated, aggressive or combative. Behavior is cooperative. Thought Content: Thought content normal.                An electronic signature was used to authenticate this note.     --Raymond Boone, DO

## 2023-03-22 ENCOUNTER — OFFICE VISIT (OUTPATIENT)
Dept: INTERNAL MEDICINE CLINIC | Facility: CLINIC | Age: 73
End: 2023-03-22
Payer: MEDICARE

## 2023-03-22 VITALS
SYSTOLIC BLOOD PRESSURE: 100 MMHG | HEIGHT: 70 IN | WEIGHT: 244 LBS | BODY MASS INDEX: 34.93 KG/M2 | OXYGEN SATURATION: 100 % | TEMPERATURE: 97.2 F | HEART RATE: 58 BPM | DIASTOLIC BLOOD PRESSURE: 70 MMHG

## 2023-03-22 DIAGNOSIS — I25.10 CORONARY ARTERY DISEASE INVOLVING NATIVE CORONARY ARTERY OF NATIVE HEART WITHOUT ANGINA PECTORIS: Primary | ICD-10-CM

## 2023-03-22 DIAGNOSIS — I10 ESSENTIAL HYPERTENSION: ICD-10-CM

## 2023-03-22 DIAGNOSIS — J30.9 ALLERGIC RHINITIS, UNSPECIFIED SEASONALITY, UNSPECIFIED TRIGGER: ICD-10-CM

## 2023-03-22 DIAGNOSIS — E11.9 TYPE 2 DIABETES MELLITUS WITHOUT COMPLICATION, WITHOUT LONG-TERM CURRENT USE OF INSULIN (HCC): ICD-10-CM

## 2023-03-22 DIAGNOSIS — K86.89 PANCREATIC INSUFFICIENCY: ICD-10-CM

## 2023-03-22 DIAGNOSIS — E78.5 DYSLIPIDEMIA: ICD-10-CM

## 2023-03-22 LAB — HBA1C MFR BLD: 6.2 %

## 2023-03-22 PROCEDURE — G8417 CALC BMI ABV UP PARAM F/U: HCPCS | Performed by: STUDENT IN AN ORGANIZED HEALTH CARE EDUCATION/TRAINING PROGRAM

## 2023-03-22 PROCEDURE — 3074F SYST BP LT 130 MM HG: CPT | Performed by: STUDENT IN AN ORGANIZED HEALTH CARE EDUCATION/TRAINING PROGRAM

## 2023-03-22 PROCEDURE — 2022F DILAT RTA XM EVC RTNOPTHY: CPT | Performed by: STUDENT IN AN ORGANIZED HEALTH CARE EDUCATION/TRAINING PROGRAM

## 2023-03-22 PROCEDURE — 3046F HEMOGLOBIN A1C LEVEL >9.0%: CPT | Performed by: STUDENT IN AN ORGANIZED HEALTH CARE EDUCATION/TRAINING PROGRAM

## 2023-03-22 PROCEDURE — 3017F COLORECTAL CA SCREEN DOC REV: CPT | Performed by: STUDENT IN AN ORGANIZED HEALTH CARE EDUCATION/TRAINING PROGRAM

## 2023-03-22 PROCEDURE — 83036 HEMOGLOBIN GLYCOSYLATED A1C: CPT | Performed by: STUDENT IN AN ORGANIZED HEALTH CARE EDUCATION/TRAINING PROGRAM

## 2023-03-22 PROCEDURE — 1036F TOBACCO NON-USER: CPT | Performed by: STUDENT IN AN ORGANIZED HEALTH CARE EDUCATION/TRAINING PROGRAM

## 2023-03-22 PROCEDURE — 3078F DIAST BP <80 MM HG: CPT | Performed by: STUDENT IN AN ORGANIZED HEALTH CARE EDUCATION/TRAINING PROGRAM

## 2023-03-22 PROCEDURE — 1123F ACP DISCUSS/DSCN MKR DOCD: CPT | Performed by: STUDENT IN AN ORGANIZED HEALTH CARE EDUCATION/TRAINING PROGRAM

## 2023-03-22 PROCEDURE — 99214 OFFICE O/P EST MOD 30 MIN: CPT | Performed by: STUDENT IN AN ORGANIZED HEALTH CARE EDUCATION/TRAINING PROGRAM

## 2023-03-22 PROCEDURE — G8484 FLU IMMUNIZE NO ADMIN: HCPCS | Performed by: STUDENT IN AN ORGANIZED HEALTH CARE EDUCATION/TRAINING PROGRAM

## 2023-03-22 PROCEDURE — G8427 DOCREV CUR MEDS BY ELIG CLIN: HCPCS | Performed by: STUDENT IN AN ORGANIZED HEALTH CARE EDUCATION/TRAINING PROGRAM

## 2023-03-22 RX ORDER — ROSUVASTATIN CALCIUM 20 MG/1
20 TABLET, COATED ORAL NIGHTLY
Qty: 90 TABLET | Refills: 2 | Status: SHIPPED | OUTPATIENT
Start: 2023-03-22

## 2023-03-22 RX ORDER — METOPROLOL SUCCINATE 100 MG/1
100 TABLET, EXTENDED RELEASE ORAL EVERY EVENING
Qty: 90 TABLET | Refills: 2 | Status: SHIPPED | OUTPATIENT
Start: 2023-03-22

## 2023-03-22 RX ORDER — FLUTICASONE PROPIONATE 50 MCG
2 SPRAY, SUSPENSION (ML) NASAL DAILY PRN
Qty: 16 G | Refills: 2 | Status: SHIPPED | OUTPATIENT
Start: 2023-03-22

## 2023-03-22 RX ORDER — LOSARTAN POTASSIUM 25 MG/1
25 TABLET ORAL DAILY
Qty: 90 TABLET | Refills: 2 | Status: SHIPPED | OUTPATIENT
Start: 2023-03-22

## 2023-03-22 SDOH — ECONOMIC STABILITY: HOUSING INSECURITY
IN THE LAST 12 MONTHS, WAS THERE A TIME WHEN YOU DID NOT HAVE A STEADY PLACE TO SLEEP OR SLEPT IN A SHELTER (INCLUDING NOW)?: PATIENT REFUSED

## 2023-03-22 SDOH — ECONOMIC STABILITY: FOOD INSECURITY: WITHIN THE PAST 12 MONTHS, YOU WORRIED THAT YOUR FOOD WOULD RUN OUT BEFORE YOU GOT MONEY TO BUY MORE.: PATIENT DECLINED

## 2023-03-22 SDOH — ECONOMIC STABILITY: FOOD INSECURITY: WITHIN THE PAST 12 MONTHS, THE FOOD YOU BOUGHT JUST DIDN'T LAST AND YOU DIDN'T HAVE MONEY TO GET MORE.: PATIENT DECLINED

## 2023-03-22 ASSESSMENT — PATIENT HEALTH QUESTIONNAIRE - PHQ9
SUM OF ALL RESPONSES TO PHQ QUESTIONS 1-9: 0
SUM OF ALL RESPONSES TO PHQ QUESTIONS 1-9: 0
1. LITTLE INTEREST OR PLEASURE IN DOING THINGS: 0
2. FEELING DOWN, DEPRESSED OR HOPELESS: 0
SUM OF ALL RESPONSES TO PHQ QUESTIONS 1-9: 0
SUM OF ALL RESPONSES TO PHQ QUESTIONS 1-9: 0
SUM OF ALL RESPONSES TO PHQ9 QUESTIONS 1 & 2: 0

## 2023-03-22 ASSESSMENT — ENCOUNTER SYMPTOMS
ANAL BLEEDING: 0
TROUBLE SWALLOWING: 1
VOMITING: 0
ABDOMINAL PAIN: 0
NAUSEA: 0
BLOOD IN STOOL: 0
DIARRHEA: 0
SHORTNESS OF BREATH: 1

## 2023-04-26 ENCOUNTER — INITIAL CONSULT (OUTPATIENT)
Dept: CARDIOLOGY CLINIC | Age: 73
End: 2023-04-26

## 2023-04-26 VITALS
DIASTOLIC BLOOD PRESSURE: 84 MMHG | HEIGHT: 69 IN | WEIGHT: 243.6 LBS | BODY MASS INDEX: 36.08 KG/M2 | HEART RATE: 57 BPM | SYSTOLIC BLOOD PRESSURE: 128 MMHG

## 2023-04-26 DIAGNOSIS — R06.09 DYSPNEA ON EXERTION: ICD-10-CM

## 2023-04-26 DIAGNOSIS — I49.3 VENTRICULAR BEAT, PREMATURE: ICD-10-CM

## 2023-04-26 DIAGNOSIS — I10 ESSENTIAL HYPERTENSION: ICD-10-CM

## 2023-04-26 DIAGNOSIS — G47.33 OSA ON CPAP: ICD-10-CM

## 2023-04-26 DIAGNOSIS — I25.10 CORONARY ARTERY DISEASE INVOLVING NATIVE CORONARY ARTERY OF NATIVE HEART WITHOUT ANGINA PECTORIS: Primary | ICD-10-CM

## 2023-04-26 DIAGNOSIS — I45.10 RBBB: ICD-10-CM

## 2023-04-26 DIAGNOSIS — Z99.89 OSA ON CPAP: ICD-10-CM

## 2023-04-26 RX ORDER — PANTOPRAZOLE SODIUM 40 MG/1
40 TABLET, DELAYED RELEASE ORAL DAILY PRN
COMMUNITY
Start: 2023-04-09

## 2023-04-26 ASSESSMENT — ENCOUNTER SYMPTOMS
ABDOMINAL PAIN: 0
HOARSE VOICE: 0
HEMATOCHEZIA: 0
STRIDOR: 0
DOUBLE VISION: 0
HEMOPTYSIS: 0
WHEEZING: 0
HEMATEMESIS: 0
EYE REDNESS: 0

## 2023-04-26 NOTE — PATIENT INSTRUCTIONS
-Important to walk 30 minutes 5 days a week-try to aim for 220 pounds-some gradual weight loss.   -Following a low sugar diet can make a difference with weight loss especially after 6 PM.

## 2023-04-26 NOTE — PROGRESS NOTES
[facility] [[email or web address]] CARDIOLOGY  [address], [address]  PHONE: [phone]          23    NAME:  Eusebia Jones : 1950  MRN: [id number]         SUBJECTIVE:   Eusebia Jones is a 67 y.o. male seen for a visit regarding the following:     Chief Complaint   Patient presents with    New Patient     Elsa Wilder    Hypertension    Coronary Artery Disease    Hyperlipidemia           HPI:     cardiac problem list:  1. Coronary artery disease- had stent in the   -Nuclear stress test from 2017-normal perfusion  2. PVCs  3. Right bundle branch block with first degree AV block  4. Hypertension  5. Hyperlipidemia  -Echo from 2021 showed an EF at 55 to 60%, mild concentric LVH, diastolic dysfunction, mild basal inferior wall hypokinesis, mildly dilated left atrium, mild TR  -Previous patient of Dr. Denisha Smith      Dear Dr. Elsa Wilder,  I saw Mr. Isaac Tapia who is a pleasant 17-year-old gentleman in cardiovascular follow-up for coronary artery disease, PVCs, right bundle branch block, hypertension and hyperlipidemia. He had last seen Dr. Denisha Smith in 2022 at which time no changes were made with his medical therapy. CAD: No complaints of any angina at this point. Said he had a potential heart attack back in the s and had a stent at that point. Likely had an inferior MI as his last echo showed some basal inferior wall hypokinesis. No complaints of any angina, no significant worsening dyspnea on exertion orthopnea PND. Some mild baseline dyspnea on exertion but he does not do any routine cardiovascular exercise. Hypertension: Denies headaches blurry vision remains compliant on his current therapy. Says he is anxious when he first comes in and his blood pressures are usually elevated and sure enough, on recheck, his blood pressures are much better.     Hyperlipidemia: Denies any significant myalgias and remains on rosuvastatin 20 mg

## 2023-09-23 ASSESSMENT — PATIENT HEALTH QUESTIONNAIRE - PHQ9
SUM OF ALL RESPONSES TO PHQ9 QUESTIONS 1 & 2: 0
SUM OF ALL RESPONSES TO PHQ QUESTIONS 1-9: 0
2. FEELING DOWN, DEPRESSED OR HOPELESS: 0
1. LITTLE INTEREST OR PLEASURE IN DOING THINGS: 0
SUM OF ALL RESPONSES TO PHQ9 QUESTIONS 1 & 2: 0
1. LITTLE INTEREST OR PLEASURE IN DOING THINGS: NOT AT ALL
2. FEELING DOWN, DEPRESSED OR HOPELESS: NOT AT ALL
SUM OF ALL RESPONSES TO PHQ QUESTIONS 1-9: 0

## 2023-09-23 NOTE — PROGRESS NOTES
Nora Dasilva. (:  1950) is a 67 y.o. male,Established patient, here for evaluation of the following chief complaint(s):  6 Month Follow-Up, Gastroesophageal Reflux, and Hypertension         ASSESSMENT/PLAN:  1. Coronary artery disease involving native coronary artery of native heart without angina pectoris  Self-reported MI in   Negative nuclear stress test in 2017  Echo on 2021 with mild concentric LVH, mild hypokinesis of basal inferior wall, EF of 55 to 60%, mildly dilated LA, AV sclerosis with trace AR and MR, mild TR  Continue aspirin, rosuvastatin, metoprolol succinate, losartan  Follow-up per cardiology    - CBC with Auto Differential; Future  - Comprehensive Metabolic Panel; Future  - Lipid Panel; Future  - metoprolol succinate (TOPROL XL) 100 MG extended release tablet; Take 1 tablet by mouth every evening  Dispense: 90 tablet; Refill: 2  - rosuvastatin (CRESTOR) 20 MG tablet; Take 1 tablet by mouth at bedtime  Dispense: 90 tablet; Refill: 2    2. Type 2 diabetes mellitus without complication, without long-term current use of insulin ([facility])  A1c 6.2% on 3/22/2023, recheck in the office today unchanged  Check urine microalbumin to creatinine ratio (within normal limits on 12/15/2022)  Check fasting blood sugar regularly. Continue metformin and lifestyle modifications    - AMB POC HEMOGLOBIN A1C  - Microalbumin / Creatinine Urine Ratio; Future  - CBC with Auto Differential; Future  - Comprehensive Metabolic Panel; Future  - Lipid Panel; Future  - T4, Free; Future  - TSH; Future  - metFORMIN (GLUCOPHAGE) 500 MG tablet; Take 1 tablet by mouth daily (with breakfast)  Dispense: 90 tablet; Refill: 2    3.  Essential hypertension  Continue metoprolol succinate and losartan  Patient reports checking blood pressure with an automated arm cuff every few weeks with systolic values in the 775W up to the 045M with diastolic values in the 10A to 90s  Counseled patient to check blood pressure

## 2023-09-27 ENCOUNTER — OFFICE VISIT (OUTPATIENT)
Dept: INTERNAL MEDICINE CLINIC | Facility: CLINIC | Age: 73
End: 2023-09-27
Payer: MEDICARE

## 2023-09-27 VITALS
HEIGHT: 69 IN | WEIGHT: 242.8 LBS | DIASTOLIC BLOOD PRESSURE: 74 MMHG | OXYGEN SATURATION: 96 % | RESPIRATION RATE: 16 BRPM | BODY MASS INDEX: 35.96 KG/M2 | SYSTOLIC BLOOD PRESSURE: 128 MMHG | TEMPERATURE: 98.2 F | HEART RATE: 32 BPM

## 2023-09-27 DIAGNOSIS — I25.10 CORONARY ARTERY DISEASE INVOLVING NATIVE CORONARY ARTERY OF NATIVE HEART WITHOUT ANGINA PECTORIS: Primary | ICD-10-CM

## 2023-09-27 DIAGNOSIS — E78.5 DYSLIPIDEMIA: ICD-10-CM

## 2023-09-27 DIAGNOSIS — R00.1 BRADYCARDIA: ICD-10-CM

## 2023-09-27 DIAGNOSIS — K21.9 GASTROESOPHAGEAL REFLUX DISEASE, UNSPECIFIED WHETHER ESOPHAGITIS PRESENT: ICD-10-CM

## 2023-09-27 DIAGNOSIS — E11.9 TYPE 2 DIABETES MELLITUS WITHOUT COMPLICATION, WITHOUT LONG-TERM CURRENT USE OF INSULIN (HCC): ICD-10-CM

## 2023-09-27 DIAGNOSIS — I49.3 PVCS (PREMATURE VENTRICULAR CONTRACTIONS): ICD-10-CM

## 2023-09-27 DIAGNOSIS — I10 ESSENTIAL HYPERTENSION: ICD-10-CM

## 2023-09-27 DIAGNOSIS — K86.89 PANCREATIC INSUFFICIENCY: ICD-10-CM

## 2023-09-27 LAB — HBA1C MFR BLD: 6.2 %

## 2023-09-27 PROCEDURE — 2022F DILAT RTA XM EVC RTNOPTHY: CPT | Performed by: STUDENT IN AN ORGANIZED HEALTH CARE EDUCATION/TRAINING PROGRAM

## 2023-09-27 PROCEDURE — 3017F COLORECTAL CA SCREEN DOC REV: CPT | Performed by: STUDENT IN AN ORGANIZED HEALTH CARE EDUCATION/TRAINING PROGRAM

## 2023-09-27 PROCEDURE — 3078F DIAST BP <80 MM HG: CPT | Performed by: STUDENT IN AN ORGANIZED HEALTH CARE EDUCATION/TRAINING PROGRAM

## 2023-09-27 PROCEDURE — 93000 ELECTROCARDIOGRAM COMPLETE: CPT | Performed by: STUDENT IN AN ORGANIZED HEALTH CARE EDUCATION/TRAINING PROGRAM

## 2023-09-27 PROCEDURE — 83036 HEMOGLOBIN GLYCOSYLATED A1C: CPT | Performed by: STUDENT IN AN ORGANIZED HEALTH CARE EDUCATION/TRAINING PROGRAM

## 2023-09-27 PROCEDURE — G8427 DOCREV CUR MEDS BY ELIG CLIN: HCPCS | Performed by: STUDENT IN AN ORGANIZED HEALTH CARE EDUCATION/TRAINING PROGRAM

## 2023-09-27 PROCEDURE — G8417 CALC BMI ABV UP PARAM F/U: HCPCS | Performed by: STUDENT IN AN ORGANIZED HEALTH CARE EDUCATION/TRAINING PROGRAM

## 2023-09-27 PROCEDURE — 3046F HEMOGLOBIN A1C LEVEL >9.0%: CPT | Performed by: STUDENT IN AN ORGANIZED HEALTH CARE EDUCATION/TRAINING PROGRAM

## 2023-09-27 PROCEDURE — 3074F SYST BP LT 130 MM HG: CPT | Performed by: STUDENT IN AN ORGANIZED HEALTH CARE EDUCATION/TRAINING PROGRAM

## 2023-09-27 PROCEDURE — 99214 OFFICE O/P EST MOD 30 MIN: CPT | Performed by: STUDENT IN AN ORGANIZED HEALTH CARE EDUCATION/TRAINING PROGRAM

## 2023-09-27 PROCEDURE — 1036F TOBACCO NON-USER: CPT | Performed by: STUDENT IN AN ORGANIZED HEALTH CARE EDUCATION/TRAINING PROGRAM

## 2023-09-27 PROCEDURE — 1123F ACP DISCUSS/DSCN MKR DOCD: CPT | Performed by: STUDENT IN AN ORGANIZED HEALTH CARE EDUCATION/TRAINING PROGRAM

## 2023-09-27 RX ORDER — METOPROLOL SUCCINATE 100 MG/1
100 TABLET, EXTENDED RELEASE ORAL EVERY EVENING
Qty: 90 TABLET | Refills: 2 | Status: SHIPPED | OUTPATIENT
Start: 2023-09-27

## 2023-09-27 RX ORDER — ROSUVASTATIN CALCIUM 20 MG/1
20 TABLET, COATED ORAL NIGHTLY
Qty: 90 TABLET | Refills: 2 | Status: SHIPPED | OUTPATIENT
Start: 2023-09-27

## 2023-09-27 RX ORDER — SILDENAFIL 100 MG/1
TABLET, FILM COATED ORAL
Qty: 30 TABLET | Status: CANCELLED | OUTPATIENT
Start: 2023-09-27

## 2023-09-27 RX ORDER — LOSARTAN POTASSIUM 25 MG/1
25 TABLET ORAL DAILY
Qty: 90 TABLET | Refills: 2 | Status: SHIPPED | OUTPATIENT
Start: 2023-09-27

## 2023-09-27 ASSESSMENT — ENCOUNTER SYMPTOMS
BLOOD IN STOOL: 0
NAUSEA: 0
TROUBLE SWALLOWING: 1
ANAL BLEEDING: 0
SHORTNESS OF BREATH: 1
VOMITING: 0

## 2023-11-14 ENCOUNTER — OFFICE VISIT (OUTPATIENT)
Dept: SLEEP MEDICINE | Age: 73
End: 2023-11-14
Payer: MEDICARE

## 2023-11-14 ENCOUNTER — TELEPHONE (OUTPATIENT)
Dept: PULMONOLOGY | Age: 73
End: 2023-11-14

## 2023-11-14 VITALS
DIASTOLIC BLOOD PRESSURE: 88 MMHG | TEMPERATURE: 97.8 F | HEIGHT: 69 IN | OXYGEN SATURATION: 95 % | BODY MASS INDEX: 36.14 KG/M2 | HEART RATE: 74 BPM | WEIGHT: 244 LBS | SYSTOLIC BLOOD PRESSURE: 124 MMHG

## 2023-11-14 DIAGNOSIS — J30.9 ALLERGIC RHINITIS, UNSPECIFIED SEASONALITY, UNSPECIFIED TRIGGER: ICD-10-CM

## 2023-11-14 DIAGNOSIS — G47.33 OSA ON CPAP: Primary | ICD-10-CM

## 2023-11-14 DIAGNOSIS — E66.01 SEVERE OBESITY (BMI 35.0-39.9) WITH COMORBIDITY (HCC): ICD-10-CM

## 2023-11-14 PROCEDURE — G8417 CALC BMI ABV UP PARAM F/U: HCPCS | Performed by: NURSE PRACTITIONER

## 2023-11-14 PROCEDURE — 1036F TOBACCO NON-USER: CPT | Performed by: NURSE PRACTITIONER

## 2023-11-14 PROCEDURE — 1123F ACP DISCUSS/DSCN MKR DOCD: CPT | Performed by: NURSE PRACTITIONER

## 2023-11-14 PROCEDURE — G8484 FLU IMMUNIZE NO ADMIN: HCPCS | Performed by: NURSE PRACTITIONER

## 2023-11-14 PROCEDURE — 3074F SYST BP LT 130 MM HG: CPT | Performed by: NURSE PRACTITIONER

## 2023-11-14 PROCEDURE — 3017F COLORECTAL CA SCREEN DOC REV: CPT | Performed by: NURSE PRACTITIONER

## 2023-11-14 PROCEDURE — G8427 DOCREV CUR MEDS BY ELIG CLIN: HCPCS | Performed by: NURSE PRACTITIONER

## 2023-11-14 PROCEDURE — 3079F DIAST BP 80-89 MM HG: CPT | Performed by: NURSE PRACTITIONER

## 2023-11-14 PROCEDURE — 99213 OFFICE O/P EST LOW 20 MIN: CPT | Performed by: NURSE PRACTITIONER

## 2023-11-14 RX ORDER — FLUTICASONE PROPIONATE 50 MCG
2 SPRAY, SUSPENSION (ML) NASAL DAILY PRN
Qty: 16 G | Refills: 2 | Status: SHIPPED | OUTPATIENT
Start: 2023-11-14 | End: 2023-11-16 | Stop reason: SDUPTHER

## 2023-11-14 ASSESSMENT — SLEEP AND FATIGUE QUESTIONNAIRES
HOW LIKELY ARE YOU TO NOD OFF OR FALL ASLEEP WHILE LYING DOWN TO REST IN THE AFTERNOON WHEN CIRCUMSTANCES PERMIT: 1
HOW LIKELY ARE YOU TO NOD OFF OR FALL ASLEEP WHILE SITTING AND TALKING TO SOMEONE: 0
ESS TOTAL SCORE: 4
HOW LIKELY ARE YOU TO NOD OFF OR FALL ASLEEP WHILE SITTING QUIETLY AFTER LUNCH WITHOUT ALCOHOL: 0
HOW LIKELY ARE YOU TO NOD OFF OR FALL ASLEEP WHILE SITTING INACTIVE IN A PUBLIC PLACE: 0
HOW LIKELY ARE YOU TO NOD OFF OR FALL ASLEEP WHILE WATCHING TV: 1
HOW LIKELY ARE YOU TO NOD OFF OR FALL ASLEEP WHEN YOU ARE A PASSENGER IN A CAR FOR AN HOUR WITHOUT A BREAK: 1
HOW LIKELY ARE YOU TO NOD OFF OR FALL ASLEEP IN A CAR, WHILE STOPPED FOR A FEW MINUTES IN TRAFFIC: 0
HOW LIKELY ARE YOU TO NOD OFF OR FALL ASLEEP WHILE SITTING AND READING: 1

## 2023-11-14 NOTE — TELEPHONE ENCOUNTER
[address]    Please send the new prescription to this pharmacy Spoke with [facility].  

## 2023-11-14 NOTE — PATIENT INSTRUCTIONS
Change pressure to APAP 9-14cm  Renew supplies  Consider CPAP comfort cover to help with mask leaks. Follow up will be in 1 year or sooner if needed    CPAP comfort cover may be able to help with mask leaks. Available for purchase online at [email or web address]

## 2023-11-15 NOTE — TELEPHONE ENCOUNTER
Current Outpatient Medications on File Prior to Visit   Medication Sig Dispense Refill    fluticasone (FLONASE) 50 MCG/ACT nasal spray 2 sprays by Nasal route daily as needed for Rhinitis or Allergies 16 g 2     Please send to pharmacy below.     Francheska Franks MA

## 2023-11-16 RX ORDER — FLUTICASONE PROPIONATE 50 MCG
2 SPRAY, SUSPENSION (ML) NASAL DAILY PRN
Qty: 16 G | Refills: 2 | Status: SHIPPED | OUTPATIENT
Start: 2023-11-16

## 2023-12-11 ENCOUNTER — TELEPHONE (OUTPATIENT)
Dept: INTERNAL MEDICINE CLINIC | Facility: CLINIC | Age: 73
End: 2023-12-11

## 2023-12-11 NOTE — TELEPHONE ENCOUNTER
Patient is requesting a letter stating that he is pre-diabetic with a normal A!c.   He is being refused new insurance because he is being reported as   \"diabetic\" and taking \"insulin\". Patient is attempting to get medicare part G plan and will need this letter in order to obtain this insurance.

## 2023-12-11 NOTE — TELEPHONE ENCOUNTER
----- Message from Mylene Gonzalez sent at 12/11/2023 11:08 AM EST -----  Subject: Message to Provider    QUESTIONS  Information for Provider? For Dr. Liu? Patient is requesting that Dr. Liu write a letter stating that he is pre-diabetic with a normal A!c.   He is being refused new insurance because he is being reported as   \"diabetic\" and taking \"insulin\" which he is not. He is trying to get part   G Medicare. He will be home after 4 today if there are questions. He is   definitely not taking insulin.  ---------------------------------------------------------------------------  --------------  CALL BACK INFO  [phone]; OK to leave message on voicemail  ---------------------------------------------------------------------------  --------------  SCRIPT ANSWERS  Relationship to Patient? Self

## 2023-12-12 NOTE — TELEPHONE ENCOUNTER
Called and left voicemail advising patient, per Dr. Lloyd Liu, Even though his A1c at last check was not in the diabetic range given he has a history of diabetes this is an ongoing diagnosis.  He is controlled through metformin and diet. He is happy to write a letter explaining as such and clarifying that he is not on insulin.  Advised patient to return my call and let me know if this letter will suffice.

## 2023-12-14 NOTE — TELEPHONE ENCOUNTER
Letter created clarifying patient is a type II diabetic but not insulin-dependent for purposes of his health insurance coverage.
21-Oct-2018 04:36

## 2023-12-27 DIAGNOSIS — E11.9 TYPE 2 DIABETES MELLITUS WITHOUT COMPLICATION, WITHOUT LONG-TERM CURRENT USE OF INSULIN (HCC): Primary | ICD-10-CM

## 2023-12-30 NOTE — PROGRESS NOTES
pressured or slurred.         Behavior: Behavior normal. Behavior is not agitated, aggressive or combative. Behavior is cooperative.         Thought Content: Thought content normal.                  An electronic signature was used to authenticate this note.    --Lloyd Liu, DO

## 2024-01-02 DIAGNOSIS — E11.9 TYPE 2 DIABETES MELLITUS WITHOUT COMPLICATION, WITHOUT LONG-TERM CURRENT USE OF INSULIN (HCC): ICD-10-CM

## 2024-01-02 DIAGNOSIS — E78.5 DYSLIPIDEMIA: ICD-10-CM

## 2024-01-02 DIAGNOSIS — I25.10 CORONARY ARTERY DISEASE INVOLVING NATIVE CORONARY ARTERY OF NATIVE HEART WITHOUT ANGINA PECTORIS: ICD-10-CM

## 2024-01-02 DIAGNOSIS — I10 ESSENTIAL HYPERTENSION: ICD-10-CM

## 2024-01-02 LAB
ALBUMIN SERPL-MCNC: 4.2 G/DL (ref 3.2–4.6)
ALBUMIN/GLOB SERPL: 1.2 (ref 0.4–1.6)
ALP SERPL-CCNC: 75 U/L (ref 50–136)
ALT SERPL-CCNC: 24 U/L (ref 12–65)
ANION GAP SERPL CALC-SCNC: 2 MMOL/L (ref 2–11)
AST SERPL-CCNC: 51 U/L (ref 15–37)
BASOPHILS # BLD: 0 K/UL (ref 0–0.2)
BASOPHILS NFR BLD: 1 % (ref 0–2)
BILIRUB SERPL-MCNC: 0.3 MG/DL (ref 0.2–1.1)
BUN SERPL-MCNC: 14 MG/DL (ref 8–23)
CALCIUM SERPL-MCNC: 9.3 MG/DL (ref 8.3–10.4)
CHLORIDE SERPL-SCNC: 107 MMOL/L (ref 103–113)
CHOLEST SERPL-MCNC: 108 MG/DL
CO2 SERPL-SCNC: 29 MMOL/L (ref 21–32)
CREAT SERPL-MCNC: 1 MG/DL (ref 0.8–1.5)
CREAT UR-MCNC: 151 MG/DL
DIFFERENTIAL METHOD BLD: ABNORMAL
EOSINOPHIL # BLD: 0.2 K/UL (ref 0–0.8)
EOSINOPHIL NFR BLD: 5 % (ref 0.5–7.8)
ERYTHROCYTE [DISTWIDTH] IN BLOOD BY AUTOMATED COUNT: 15.2 % (ref 11.9–14.6)
EST. AVERAGE GLUCOSE BLD GHB EST-MCNC: 134 MG/DL
GLOBULIN SER CALC-MCNC: 3.5 G/DL (ref 2.8–4.5)
GLUCOSE SERPL-MCNC: 110 MG/DL (ref 65–100)
HBA1C MFR BLD: 6.3 % (ref 4.8–5.6)
HCT VFR BLD AUTO: 40 % (ref 41.1–50.3)
HDLC SERPL-MCNC: 36 MG/DL (ref 40–60)
HDLC SERPL: 3
HGB BLD-MCNC: 11.9 G/DL (ref 13.6–17.2)
IMM GRANULOCYTES # BLD AUTO: 0 K/UL (ref 0–0.5)
IMM GRANULOCYTES NFR BLD AUTO: 0 % (ref 0–5)
LDLC SERPL CALC-MCNC: 36.6 MG/DL
LYMPHOCYTES # BLD: 1.4 K/UL (ref 0.5–4.6)
LYMPHOCYTES NFR BLD: 30 % (ref 13–44)
MCH RBC QN AUTO: 24.6 PG (ref 26.1–32.9)
MCHC RBC AUTO-ENTMCNC: 29.8 G/DL (ref 31.4–35)
MCV RBC AUTO: 82.8 FL (ref 82–102)
MICROALBUMIN UR-MCNC: 0.74 MG/DL
MICROALBUMIN/CREAT UR-RTO: 5 MG/G (ref 0–30)
MONOCYTES # BLD: 0.3 K/UL (ref 0.1–1.3)
MONOCYTES NFR BLD: 7 % (ref 4–12)
NEUTS SEG # BLD: 2.6 K/UL (ref 1.7–8.2)
NEUTS SEG NFR BLD: 57 % (ref 43–78)
NRBC # BLD: 0 K/UL (ref 0–0.2)
PLATELET # BLD AUTO: 134 K/UL (ref 150–450)
PMV BLD AUTO: 10.8 FL (ref 9.4–12.3)
POTASSIUM SERPL-SCNC: 4.1 MMOL/L (ref 3.5–5.1)
PROT SERPL-MCNC: 7.7 G/DL (ref 6.3–8.2)
RBC # BLD AUTO: 4.83 M/UL (ref 4.23–5.6)
SODIUM SERPL-SCNC: 138 MMOL/L (ref 136–146)
T4 FREE SERPL-MCNC: 0.9 NG/DL (ref 0.78–1.46)
TRIGL SERPL-MCNC: 177 MG/DL (ref 35–150)
TSH, 3RD GENERATION: 1.52 UIU/ML (ref 0.36–3.74)
VLDLC SERPL CALC-MCNC: 35.4 MG/DL (ref 6–23)
WBC # BLD AUTO: 4.6 K/UL (ref 4.3–11.1)

## 2024-01-05 ENCOUNTER — OFFICE VISIT (OUTPATIENT)
Dept: INTERNAL MEDICINE CLINIC | Facility: CLINIC | Age: 74
End: 2024-01-05

## 2024-01-05 VITALS
TEMPERATURE: 97.7 F | HEART RATE: 63 BPM | DIASTOLIC BLOOD PRESSURE: 76 MMHG | OXYGEN SATURATION: 97 % | HEIGHT: 69 IN | SYSTOLIC BLOOD PRESSURE: 130 MMHG | BODY MASS INDEX: 35.55 KG/M2 | WEIGHT: 240 LBS

## 2024-01-05 DIAGNOSIS — E11.9 TYPE 2 DIABETES MELLITUS WITHOUT COMPLICATION, WITHOUT LONG-TERM CURRENT USE OF INSULIN (HCC): ICD-10-CM

## 2024-01-05 DIAGNOSIS — I25.10 CORONARY ARTERY DISEASE INVOLVING NATIVE CORONARY ARTERY OF NATIVE HEART WITHOUT ANGINA PECTORIS: Primary | ICD-10-CM

## 2024-01-05 DIAGNOSIS — Z12.5 PROSTATE CANCER SCREENING: ICD-10-CM

## 2024-01-05 DIAGNOSIS — I10 ESSENTIAL HYPERTENSION: ICD-10-CM

## 2024-01-05 DIAGNOSIS — R21 RASH OF HAND: ICD-10-CM

## 2024-01-05 DIAGNOSIS — K86.89 PANCREATIC INSUFFICIENCY: ICD-10-CM

## 2024-01-05 DIAGNOSIS — K21.9 GASTROESOPHAGEAL REFLUX DISEASE, UNSPECIFIED WHETHER ESOPHAGITIS PRESENT: ICD-10-CM

## 2024-01-05 DIAGNOSIS — K75.81 NASH (NONALCOHOLIC STEATOHEPATITIS): ICD-10-CM

## 2024-01-05 DIAGNOSIS — D69.6 THROMBOCYTOPENIA (HCC): ICD-10-CM

## 2024-01-05 DIAGNOSIS — E78.5 DYSLIPIDEMIA: ICD-10-CM

## 2024-01-05 DIAGNOSIS — I49.3 PVCS (PREMATURE VENTRICULAR CONTRACTIONS): ICD-10-CM

## 2024-01-05 RX ORDER — ASPIRIN 81 MG/1
81 TABLET ORAL EVERY OTHER DAY
COMMUNITY

## 2024-01-05 RX ORDER — METOPROLOL SUCCINATE 100 MG/1
100 TABLET, EXTENDED RELEASE ORAL EVERY EVENING
Qty: 90 TABLET | Refills: 2 | Status: SHIPPED | OUTPATIENT
Start: 2024-01-05

## 2024-01-05 RX ORDER — ROSUVASTATIN CALCIUM 20 MG/1
20 TABLET, COATED ORAL NIGHTLY
Qty: 90 TABLET | Refills: 2 | Status: SHIPPED | OUTPATIENT
Start: 2024-01-05

## 2024-01-05 RX ORDER — LOSARTAN POTASSIUM 25 MG/1
25 TABLET ORAL DAILY
Qty: 90 TABLET | Refills: 2 | Status: SHIPPED | OUTPATIENT
Start: 2024-01-05

## 2024-01-05 ASSESSMENT — ENCOUNTER SYMPTOMS
BLOOD IN STOOL: 0
ANAL BLEEDING: 0
SHORTNESS OF BREATH: 0
ABDOMINAL PAIN: 1

## 2024-01-10 NOTE — PROGRESS NOTES
No additional comment Subjective   HPI: Johnathon Andrade is a 7 y.o. male is a new patient here for evaluation and treatment of a wart on the right knee.  He was previously treated with liquid nitrogen back in February 2022.  All of the other warts went away however this one remained.  It does not itch or bother the patient however parents would like this to be treated.    ROS: No other skin or systemic complaints other than what is documented elsewhere in the note.    ALLERGIES: Patient has no known allergies.    SOCIAL:  has no history on file for tobacco use, alcohol use, and drug use.    Objective   Right Knee - Anterior  3 mm thickened flesh-colored papule        Assessment/Plan   1. Other viral warts  Right Knee - Anterior    I recommended cryotherapy for this area.  Discussed possible side effects of cryotherapy such as erythema, swelling, and blistering.  Discussed that warts frequently need to multiple rounds of liquid nitrogen to achieve resolution.  Dad verbalizes understanding and opts for cryotherapy today.    Cryotherapy, skin lesion - Right Knee - Anterior    Related Procedures  Follow Up In Dermatology - Established Patient         FOLLOW UP: 4 to 6 weeks LN2    The patient was encouraged to contact me with any further questions or concerns.  Carlie Johnson PA-C  1/26/2024

## 2024-05-17 DIAGNOSIS — E11.9 TYPE 2 DIABETES MELLITUS WITHOUT COMPLICATION, WITHOUT LONG-TERM CURRENT USE OF INSULIN (HCC): ICD-10-CM

## 2024-05-17 DIAGNOSIS — I10 ESSENTIAL HYPERTENSION: ICD-10-CM

## 2024-05-20 RX ORDER — LOSARTAN POTASSIUM 25 MG/1
25 TABLET ORAL DAILY
Qty: 90 TABLET | Refills: 2 | OUTPATIENT
Start: 2024-05-20

## 2024-07-07 NOTE — PROGRESS NOTES
Destin Chang Jr. (:  1950) is a 73 y.o. male,Established patient, here for evaluation of the following chief complaint(s):  6 Month Follow-Up (Pt is here for a 6 month follow up on their diabetes. )      Assessment & Plan   1. Coronary artery disease involving native coronary artery of native heart without angina pectoris  Self-reported MI in 2008  Negative nuclear stress test in 2017  Echo on 2021 with mild concentric LVH, mild hypokinesis of basal inferior wall, EF of 55 to 60%, mildly dilated LA, AV sclerosis with trace AR and MR, mild TR  Prior EKG with borderline first-degree AV block with heart rate of 63 bpm, PVCs, RBBB, S wave in lead I, Q wave with T wave inversion in lead III overall similar to prior  Continue aspirin, metoprolol succinate, rosuvastatin, losartan  Follow-up per cardiology    - CBC with Auto Differential; Future  - Comprehensive Metabolic Panel; Future  - Lipid Panel; Future    2. PVCs (premature ventricular contractions)  Cardiac work-up as above  Continue metoprolol succinate  Check electrolytes and TFTs    - T4, Free; Future  - TSH; Future    3. Type 2 diabetes mellitus without complication, without long-term current use of insulin ([facility])  A1c 6.3% on 2024, recheck in office today unchanged  Urine microalbumin to creatinine ratio within normal limits on 2024  Check fasting blood sugar regularly.  Continue metformin and lifestyle modifications    - AMB POC HEMOGLOBIN A1C  - Lipid Panel; Future  - T4, Free; Future  - TSH; Future    4. Essential hypertension  Continue metoprolol succinate and losartan     - CBC with Auto Differential; Future  - Comprehensive Metabolic Panel; Future  - Lipid Panel; Future  - T4, Free; Future  - TSH; Future    5. Dyslipidemia  Lipid panel from 2024 with elevated triglycerides of 177 however LDL at goal at 36  Continue rosuvastatin    - CBC with Auto Differential; Future  - Comprehensive Metabolic Panel; Future  - Lipid

## 2024-07-08 ENCOUNTER — OFFICE VISIT (OUTPATIENT)
Dept: INTERNAL MEDICINE CLINIC | Facility: CLINIC | Age: 74
End: 2024-07-08
Payer: MEDICARE

## 2024-07-08 VITALS
OXYGEN SATURATION: 95 % | HEIGHT: 69 IN | BODY MASS INDEX: 35.25 KG/M2 | HEART RATE: 64 BPM | DIASTOLIC BLOOD PRESSURE: 82 MMHG | TEMPERATURE: 98.1 F | WEIGHT: 238 LBS | SYSTOLIC BLOOD PRESSURE: 126 MMHG

## 2024-07-08 DIAGNOSIS — I49.3 PVCS (PREMATURE VENTRICULAR CONTRACTIONS): ICD-10-CM

## 2024-07-08 DIAGNOSIS — K75.81 NASH (NONALCOHOLIC STEATOHEPATITIS): ICD-10-CM

## 2024-07-08 DIAGNOSIS — I25.10 CORONARY ARTERY DISEASE INVOLVING NATIVE CORONARY ARTERY OF NATIVE HEART WITHOUT ANGINA PECTORIS: Primary | ICD-10-CM

## 2024-07-08 DIAGNOSIS — K86.89 PANCREATIC INSUFFICIENCY: ICD-10-CM

## 2024-07-08 DIAGNOSIS — E11.9 TYPE 2 DIABETES MELLITUS WITHOUT COMPLICATION, WITHOUT LONG-TERM CURRENT USE OF INSULIN (HCC): ICD-10-CM

## 2024-07-08 DIAGNOSIS — D69.6 THROMBOCYTOPENIA (HCC): ICD-10-CM

## 2024-07-08 DIAGNOSIS — I10 ESSENTIAL HYPERTENSION: ICD-10-CM

## 2024-07-08 DIAGNOSIS — E78.5 DYSLIPIDEMIA: ICD-10-CM

## 2024-07-08 DIAGNOSIS — Z12.11 COLON CANCER SCREENING: ICD-10-CM

## 2024-07-08 DIAGNOSIS — Z12.5 PROSTATE CANCER SCREENING: ICD-10-CM

## 2024-07-08 DIAGNOSIS — K21.9 GASTROESOPHAGEAL REFLUX DISEASE, UNSPECIFIED WHETHER ESOPHAGITIS PRESENT: ICD-10-CM

## 2024-07-08 LAB — HBA1C MFR BLD: 6.3 %

## 2024-07-08 PROCEDURE — 99214 OFFICE O/P EST MOD 30 MIN: CPT | Performed by: STUDENT IN AN ORGANIZED HEALTH CARE EDUCATION/TRAINING PROGRAM

## 2024-07-08 PROCEDURE — 3044F HG A1C LEVEL LT 7.0%: CPT | Performed by: STUDENT IN AN ORGANIZED HEALTH CARE EDUCATION/TRAINING PROGRAM

## 2024-07-08 PROCEDURE — 3074F SYST BP LT 130 MM HG: CPT | Performed by: STUDENT IN AN ORGANIZED HEALTH CARE EDUCATION/TRAINING PROGRAM

## 2024-07-08 PROCEDURE — G8427 DOCREV CUR MEDS BY ELIG CLIN: HCPCS | Performed by: STUDENT IN AN ORGANIZED HEALTH CARE EDUCATION/TRAINING PROGRAM

## 2024-07-08 PROCEDURE — 3017F COLORECTAL CA SCREEN DOC REV: CPT | Performed by: STUDENT IN AN ORGANIZED HEALTH CARE EDUCATION/TRAINING PROGRAM

## 2024-07-08 PROCEDURE — G8417 CALC BMI ABV UP PARAM F/U: HCPCS | Performed by: STUDENT IN AN ORGANIZED HEALTH CARE EDUCATION/TRAINING PROGRAM

## 2024-07-08 PROCEDURE — 2022F DILAT RTA XM EVC RTNOPTHY: CPT | Performed by: STUDENT IN AN ORGANIZED HEALTH CARE EDUCATION/TRAINING PROGRAM

## 2024-07-08 PROCEDURE — 1036F TOBACCO NON-USER: CPT | Performed by: STUDENT IN AN ORGANIZED HEALTH CARE EDUCATION/TRAINING PROGRAM

## 2024-07-08 PROCEDURE — 83036 HEMOGLOBIN GLYCOSYLATED A1C: CPT | Performed by: STUDENT IN AN ORGANIZED HEALTH CARE EDUCATION/TRAINING PROGRAM

## 2024-07-08 PROCEDURE — 1123F ACP DISCUSS/DSCN MKR DOCD: CPT | Performed by: STUDENT IN AN ORGANIZED HEALTH CARE EDUCATION/TRAINING PROGRAM

## 2024-07-08 PROCEDURE — 3079F DIAST BP 80-89 MM HG: CPT | Performed by: STUDENT IN AN ORGANIZED HEALTH CARE EDUCATION/TRAINING PROGRAM

## 2024-07-08 SDOH — ECONOMIC STABILITY: FOOD INSECURITY: WITHIN THE PAST 12 MONTHS, THE FOOD YOU BOUGHT JUST DIDN'T LAST AND YOU DIDN'T HAVE MONEY TO GET MORE.: PATIENT DECLINED

## 2024-07-08 SDOH — ECONOMIC STABILITY: FOOD INSECURITY: WITHIN THE PAST 12 MONTHS, YOU WORRIED THAT YOUR FOOD WOULD RUN OUT BEFORE YOU GOT MONEY TO BUY MORE.: PATIENT DECLINED

## 2024-07-08 SDOH — ECONOMIC STABILITY: HOUSING INSECURITY
IN THE LAST 12 MONTHS, WAS THERE A TIME WHEN YOU DID NOT HAVE A STEADY PLACE TO SLEEP OR SLEPT IN A SHELTER (INCLUDING NOW)?: PATIENT DECLINED

## 2024-07-08 SDOH — ECONOMIC STABILITY: INCOME INSECURITY: HOW HARD IS IT FOR YOU TO PAY FOR THE VERY BASICS LIKE FOOD, HOUSING, MEDICAL CARE, AND HEATING?: PATIENT DECLINED

## 2024-07-08 ASSESSMENT — ENCOUNTER SYMPTOMS
TROUBLE SWALLOWING: 0
ANAL BLEEDING: 0
SHORTNESS OF BREATH: 0
DIARRHEA: 1
BLOOD IN STOOL: 0
NAUSEA: 1
BACK PAIN: 1

## 2024-07-08 ASSESSMENT — PATIENT HEALTH QUESTIONNAIRE - PHQ9
SUM OF ALL RESPONSES TO PHQ QUESTIONS 1-9: 0
2. FEELING DOWN, DEPRESSED OR HOPELESS: NOT AT ALL
SUM OF ALL RESPONSES TO PHQ9 QUESTIONS 1 & 2: 0
SUM OF ALL RESPONSES TO PHQ QUESTIONS 1-9: 0
SUM OF ALL RESPONSES TO PHQ QUESTIONS 1-9: 0
1. LITTLE INTEREST OR PLEASURE IN DOING THINGS: NOT AT ALL
SUM OF ALL RESPONSES TO PHQ QUESTIONS 1-9: 0

## 2024-07-25 DIAGNOSIS — E11.9 TYPE 2 DIABETES MELLITUS WITHOUT COMPLICATION, WITHOUT LONG-TERM CURRENT USE OF INSULIN (HCC): Primary | ICD-10-CM

## 2024-08-09 ENCOUNTER — LAB (OUTPATIENT)
Dept: INTERNAL MEDICINE CLINIC | Facility: CLINIC | Age: 74
End: 2024-08-09

## 2024-08-09 DIAGNOSIS — I49.3 PVCS (PREMATURE VENTRICULAR CONTRACTIONS): ICD-10-CM

## 2024-08-09 DIAGNOSIS — E78.5 DYSLIPIDEMIA: ICD-10-CM

## 2024-08-09 DIAGNOSIS — D69.6 THROMBOCYTOPENIA (HCC): ICD-10-CM

## 2024-08-09 DIAGNOSIS — I10 ESSENTIAL HYPERTENSION: ICD-10-CM

## 2024-08-09 DIAGNOSIS — I25.10 CORONARY ARTERY DISEASE INVOLVING NATIVE CORONARY ARTERY OF NATIVE HEART WITHOUT ANGINA PECTORIS: ICD-10-CM

## 2024-08-09 DIAGNOSIS — E11.9 TYPE 2 DIABETES MELLITUS WITHOUT COMPLICATION, WITHOUT LONG-TERM CURRENT USE OF INSULIN (HCC): ICD-10-CM

## 2024-08-09 DIAGNOSIS — Z12.5 PROSTATE CANCER SCREENING: ICD-10-CM

## 2024-08-09 LAB
ALBUMIN SERPL-MCNC: 4.3 G/DL (ref 3.2–4.6)
ALBUMIN/GLOB SERPL: 1.4 (ref 1–1.9)
ALP SERPL-CCNC: 59 U/L (ref 40–129)
ALT SERPL-CCNC: 26 U/L (ref 12–65)
ANION GAP SERPL CALC-SCNC: 11 MMOL/L (ref 9–18)
AST SERPL-CCNC: 60 U/L (ref 15–37)
BASOPHILS # BLD: 0 K/UL (ref 0–0.2)
BASOPHILS NFR BLD: 1 % (ref 0–2)
BILIRUB SERPL-MCNC: 0.4 MG/DL (ref 0–1.2)
BUN SERPL-MCNC: 12 MG/DL (ref 8–23)
CALCIUM SERPL-MCNC: 9.3 MG/DL (ref 8.8–10.2)
CHLORIDE SERPL-SCNC: 104 MMOL/L (ref 98–107)
CHOLEST SERPL-MCNC: 102 MG/DL (ref 0–200)
CO2 SERPL-SCNC: 26 MMOL/L (ref 20–28)
CREAT SERPL-MCNC: 1.01 MG/DL (ref 0.8–1.3)
DIFFERENTIAL METHOD BLD: ABNORMAL
EOSINOPHIL # BLD: 0.1 K/UL (ref 0–0.8)
EOSINOPHIL NFR BLD: 3 % (ref 0.5–7.8)
ERYTHROCYTE [DISTWIDTH] IN BLOOD BY AUTOMATED COUNT: 16.2 % (ref 11.9–14.6)
GLOBULIN SER CALC-MCNC: 3.1 G/DL (ref 2.3–3.5)
GLUCOSE SERPL-MCNC: 104 MG/DL (ref 70–99)
HCT VFR BLD AUTO: 42.1 % (ref 41.1–50.3)
HDLC SERPL-MCNC: 42 MG/DL (ref 40–60)
HDLC SERPL: 2.4 (ref 0–5)
HGB BLD-MCNC: 12.6 G/DL (ref 13.6–17.2)
IMM GRANULOCYTES # BLD AUTO: 0 K/UL (ref 0–0.5)
IMM GRANULOCYTES NFR BLD AUTO: 0 % (ref 0–5)
LDLC SERPL CALC-MCNC: 33 MG/DL (ref 0–100)
LYMPHOCYTES # BLD: 1.6 K/UL (ref 0.5–4.6)
LYMPHOCYTES NFR BLD: 36 % (ref 13–44)
MCH RBC QN AUTO: 25.1 PG (ref 26.1–32.9)
MCHC RBC AUTO-ENTMCNC: 29.9 G/DL (ref 31.4–35)
MCV RBC AUTO: 84 FL (ref 82–102)
MONOCYTES # BLD: 0.3 K/UL (ref 0.1–1.3)
MONOCYTES NFR BLD: 7 % (ref 4–12)
NEUTS SEG # BLD: 2.3 K/UL (ref 1.7–8.2)
NEUTS SEG NFR BLD: 53 % (ref 43–78)
NRBC # BLD: 0 K/UL (ref 0–0.2)
PLATELET # BLD AUTO: 116 K/UL (ref 150–450)
PMV BLD AUTO: 10.6 FL (ref 9.4–12.3)
POTASSIUM SERPL-SCNC: 4 MMOL/L (ref 3.5–5.1)
PROT SERPL-MCNC: 7.4 G/DL (ref 6.3–8.2)
PSA SERPL-MCNC: 0.4 NG/ML (ref 0–4)
RBC # BLD AUTO: 5.01 M/UL (ref 4.23–5.6)
SODIUM SERPL-SCNC: 141 MMOL/L (ref 136–145)
T4 FREE SERPL-MCNC: 1 NG/DL (ref 0.9–1.7)
TRIGL SERPL-MCNC: 133 MG/DL (ref 0–150)
TSH, 3RD GENERATION: 2.18 UIU/ML (ref 0.27–4.2)
VLDLC SERPL CALC-MCNC: 27 MG/DL (ref 6–23)
WBC # BLD AUTO: 4.3 K/UL (ref 4.3–11.1)

## 2024-08-12 LAB — PATH REV BLD -IMP: NORMAL

## 2024-08-22 DIAGNOSIS — I25.10 CORONARY ARTERY DISEASE INVOLVING NATIVE CORONARY ARTERY OF NATIVE HEART WITHOUT ANGINA PECTORIS: ICD-10-CM

## 2024-08-22 DIAGNOSIS — E78.5 DYSLIPIDEMIA: ICD-10-CM

## 2024-08-22 RX ORDER — ROSUVASTATIN CALCIUM 20 MG/1
20 TABLET, COATED ORAL NIGHTLY
Qty: 90 TABLET | Refills: 2 | OUTPATIENT
Start: 2024-08-22

## 2024-10-08 DIAGNOSIS — I10 ESSENTIAL HYPERTENSION: ICD-10-CM

## 2024-10-08 DIAGNOSIS — E11.9 TYPE 2 DIABETES MELLITUS WITHOUT COMPLICATION, WITHOUT LONG-TERM CURRENT USE OF INSULIN (HCC): ICD-10-CM

## 2024-10-08 DIAGNOSIS — E78.5 DYSLIPIDEMIA: ICD-10-CM

## 2024-10-08 DIAGNOSIS — I25.10 CORONARY ARTERY DISEASE INVOLVING NATIVE CORONARY ARTERY OF NATIVE HEART WITHOUT ANGINA PECTORIS: ICD-10-CM

## 2024-10-08 RX ORDER — METOPROLOL SUCCINATE 100 MG/1
100 TABLET, EXTENDED RELEASE ORAL EVERY EVENING
Qty: 90 TABLET | Refills: 2 | Status: SHIPPED | OUTPATIENT
Start: 2024-10-08

## 2024-10-08 RX ORDER — ROSUVASTATIN CALCIUM 20 MG/1
20 TABLET, COATED ORAL NIGHTLY
Qty: 90 TABLET | Refills: 2 | Status: SHIPPED | OUTPATIENT
Start: 2024-10-08

## 2024-10-08 RX ORDER — LOSARTAN POTASSIUM 25 MG/1
25 TABLET ORAL DAILY
Qty: 90 TABLET | Refills: 2 | Status: SHIPPED | OUTPATIENT
Start: 2024-10-08

## 2024-10-08 NOTE — TELEPHONE ENCOUNTER
Pt called requesting a Medication Refill Request    Name of Medications:   metoprolol succinate (TOPROL XL) 100 MG extended release tablet      rosuvastatin (CRESTOR) 20 MG tablet     losartan (COZAAR) 25 MG tablet      metFORMIN (GLUCOPHAGE) 500 MG tablet     Preferred Pharmacy: [facility] to [address] [place] [address] [place] [address] - [phone]     Last Appt. Date: 7/8/2024    Next Appt. Date: 1/8/2025    Additional Information For Provider: Pt stated he is going to be out of his medications

## 2024-10-18 ENCOUNTER — OFFICE VISIT (OUTPATIENT)
Dept: INTERNAL MEDICINE CLINIC | Facility: CLINIC | Age: 74
End: 2024-10-18

## 2024-10-18 VITALS
BODY MASS INDEX: 32.7 KG/M2 | WEIGHT: 220.8 LBS | OXYGEN SATURATION: 98 % | DIASTOLIC BLOOD PRESSURE: 86 MMHG | SYSTOLIC BLOOD PRESSURE: 122 MMHG | HEIGHT: 69 IN | HEART RATE: 57 BPM | TEMPERATURE: 97.2 F

## 2024-10-18 DIAGNOSIS — R19.7 DIARRHEA, UNSPECIFIED TYPE: Primary | ICD-10-CM

## 2024-10-18 ASSESSMENT — ENCOUNTER SYMPTOMS
ABDOMINAL PAIN: 0
VOMITING: 0
ABDOMINAL DISTENTION: 0
SHORTNESS OF BREATH: 0
DIARRHEA: 0
CONSTIPATION: 0
COUGH: 0
BLOOD IN STOOL: 0
NAUSEA: 0

## 2024-10-18 NOTE — PROGRESS NOTES
[facility]      10/18/2024    Patient Name: Destin Chang Jr.  :  1950      Chief Complaint:  Chief Complaint   Patient presents with    Other     Pt stats he had diarrhea for about 4 weeks he stats he took an imodium 10/16/2024 and a little yesterday 10/17/2024.         HPI  Patient presents today with complaint of diarrhea. Symptoms started about 3-4 weeks ago.   Episodes have been occurring ABOUT 2-3 times in a 24-hour period. Associated symptoms include abdominal pain, nausea, vomiting (last episode of vomiting was about one week ago).   He denies any fever or blood in the stool. No constipation. He took a dose of Imodium on 10/16/24 and another dose again yesterday. No recurrence of diarrhea since that time. Denies any further abdominal pain or nausea.     Completed colonoscopy around . We do not have these records to review. Patient had a negative Cologuard 3/31/21. Patient declines colonoscopy. Would prefer to repeat Cologuard testing, order placed 24. Needs to complete.         Past Medical History:   Diagnosis Date    Anemia     only blood transfusion ~    CAD (coronary artery disease)     Former smoker, stopped smoking in distant past     Quit   1 ppd x 20 yrs    GERD (gastroesophageal reflux disease)     Hiatal hernia, pepcid daily, well controlled    Heart attack ([facility])     \"pt unsure if stent was placed\" -   @ [facility]    Hiatal hernia     Hypercholesterolemia     Hypertension     managed with meds    Lactose intolerance     MI, old       @ [facility]- no stents or interventions    Morbid obesity 2019    BMI 41.14    CABRERA (nonalcoholic steatohepatitis)     ARCHANA (obstructive sleep apnea) 2017    uses CPAP    Right bundle branch block     Type 2 diabetes mellitus (HCC)        Past Surgical History:   Procedure Laterality Date    APPENDECTOMY  2018    CARDIAC CATHETERIZATION      - no intervention    COLONOSCOPY      about  Dr. Charles

## 2024-10-21 DIAGNOSIS — R19.7 DIARRHEA, UNSPECIFIED TYPE: ICD-10-CM

## 2024-10-24 LAB
O+P SPEC MICRO: NORMAL
O+P STL CONC: NORMAL
SPECIMEN SOURCE: NORMAL

## 2024-10-25 LAB
BACTERIA SPEC CULT: NORMAL
SERVICE CMNT-IMP: NORMAL

## 2024-11-07 LAB — NONINV COLON CA DNA+OCC BLD SCRN STL QL: NEGATIVE

## 2024-12-16 ENCOUNTER — TELEPHONE (OUTPATIENT)
Dept: INTERNAL MEDICINE CLINIC | Facility: CLINIC | Age: 74
End: 2024-12-16

## 2024-12-16 DIAGNOSIS — E11.9 TYPE 2 DIABETES MELLITUS WITHOUT COMPLICATION, WITHOUT LONG-TERM CURRENT USE OF INSULIN (HCC): ICD-10-CM

## 2024-12-16 DIAGNOSIS — E78.5 DYSLIPIDEMIA: ICD-10-CM

## 2024-12-16 DIAGNOSIS — I10 ESSENTIAL HYPERTENSION: ICD-10-CM

## 2024-12-16 DIAGNOSIS — I25.10 CORONARY ARTERY DISEASE INVOLVING NATIVE CORONARY ARTERY OF NATIVE HEART WITHOUT ANGINA PECTORIS: Primary | ICD-10-CM

## 2024-12-17 NOTE — TELEPHONE ENCOUNTER
----- Message from BYRON GARCIA LPN sent at 12/16/2024 10:48 AM EST -----  This pt is scheduled for labs on Jan. 2nd, but has no orders in his chart. Can you please place orders? Thanks!

## 2024-12-17 NOTE — TELEPHONE ENCOUNTER
Orders Placed This Encounter    CBC with Auto Differential     Standing Status:   Future     Standing Expiration Date:   12/16/2025    Comprehensive Metabolic Panel     Standing Status:   Future     Standing Expiration Date:   6/16/2025    Hemoglobin A1C     Standing Status:   Future     Standing Expiration Date:   6/16/2025    Lipid Panel     Standing Status:   Future     Standing Expiration Date:   6/16/2025    TSH     Standing Status:   Future     Standing Expiration Date:   12/16/2025    T4, Free     Standing Status:   Future     Standing Expiration Date:   12/16/2025    Urinalysis     Standing Status:   Future     Standing Expiration Date:   12/16/2025

## 2025-01-05 NOTE — ASSESSMENT & PLAN NOTE
Rare alcohol use  Abdominal ultrasound on 10/19/2020 with moderate diffuse hepatic steatosis  Labs from 1/2/2025 with AST elevated at 77, remaining LFTs within normal limits.  Platelet count low at 120 but stable  Continue lifestyle changes

## 2025-01-05 NOTE — ASSESSMENT & PLAN NOTE
Self-reported MI in 2008  Negative nuclear stress test in 2017  Echo on 11/19/2021 with mild concentric LVH, mild hypokinesis of basal inferior wall, EF of 55 to 60%, mildly dilated LA, AV sclerosis with trace AR and MR, mild TR  Prior EKG with borderline first-degree AV block with heart rate of 63 bpm, PVCs, RBBB, S wave in lead I, Q wave with T wave inversion in lead III overall similar to prior  Continue aspirin, metoprolol succinate, rosuvastatin, losartan  Follow-up per cardiology

## 2025-01-08 ENCOUNTER — OFFICE VISIT (OUTPATIENT)
Dept: INTERNAL MEDICINE CLINIC | Facility: CLINIC | Age: 75
End: 2025-01-08

## 2025-01-08 VITALS
HEIGHT: 69 IN | SYSTOLIC BLOOD PRESSURE: 142 MMHG | OXYGEN SATURATION: 98 % | DIASTOLIC BLOOD PRESSURE: 90 MMHG | WEIGHT: 221 LBS | BODY MASS INDEX: 32.73 KG/M2 | TEMPERATURE: 98.5 F | HEART RATE: 49 BPM

## 2025-01-08 DIAGNOSIS — E78.5 DYSLIPIDEMIA: ICD-10-CM

## 2025-01-08 DIAGNOSIS — Z00.00 MEDICARE ANNUAL WELLNESS VISIT, SUBSEQUENT: Primary | ICD-10-CM

## 2025-01-08 DIAGNOSIS — K86.89 PANCREATIC INSUFFICIENCY: ICD-10-CM

## 2025-01-08 DIAGNOSIS — I49.3 PVCS (PREMATURE VENTRICULAR CONTRACTIONS): ICD-10-CM

## 2025-01-08 DIAGNOSIS — I25.10 CORONARY ARTERY DISEASE INVOLVING NATIVE CORONARY ARTERY OF NATIVE HEART WITHOUT ANGINA PECTORIS: ICD-10-CM

## 2025-01-08 DIAGNOSIS — D69.6 THROMBOCYTOPENIA (HCC): ICD-10-CM

## 2025-01-08 DIAGNOSIS — K75.81 NASH (NONALCOHOLIC STEATOHEPATITIS): ICD-10-CM

## 2025-01-08 DIAGNOSIS — I10 ESSENTIAL HYPERTENSION: ICD-10-CM

## 2025-01-08 DIAGNOSIS — Z23 ENCOUNTER FOR IMMUNIZATION: ICD-10-CM

## 2025-01-08 DIAGNOSIS — D64.9 NORMOCYTIC ANEMIA: ICD-10-CM

## 2025-01-08 DIAGNOSIS — E11.9 TYPE 2 DIABETES MELLITUS WITHOUT COMPLICATION, WITHOUT LONG-TERM CURRENT USE OF INSULIN (HCC): ICD-10-CM

## 2025-01-08 SDOH — ECONOMIC STABILITY: FOOD INSECURITY: WITHIN THE PAST 12 MONTHS, THE FOOD YOU BOUGHT JUST DIDN'T LAST AND YOU DIDN'T HAVE MONEY TO GET MORE.: NEVER TRUE

## 2025-01-08 SDOH — ECONOMIC STABILITY: FOOD INSECURITY: WITHIN THE PAST 12 MONTHS, YOU WORRIED THAT YOUR FOOD WOULD RUN OUT BEFORE YOU GOT MONEY TO BUY MORE.: NEVER TRUE

## 2025-01-08 ASSESSMENT — ENCOUNTER SYMPTOMS
WHEEZING: 0
SHORTNESS OF BREATH: 1
ABDOMINAL PAIN: 0
DIARRHEA: 0
COUGH: 1
BLOOD IN STOOL: 0
ANAL BLEEDING: 0

## 2025-01-08 ASSESSMENT — PATIENT HEALTH QUESTIONNAIRE - PHQ9
2. FEELING DOWN, DEPRESSED OR HOPELESS: NOT AT ALL
1. LITTLE INTEREST OR PLEASURE IN DOING THINGS: NOT AT ALL
SUM OF ALL RESPONSES TO PHQ QUESTIONS 1-9: 0
SUM OF ALL RESPONSES TO PHQ QUESTIONS 1-9: 0
SUM OF ALL RESPONSES TO PHQ9 QUESTIONS 1 & 2: 0
SUM OF ALL RESPONSES TO PHQ QUESTIONS 1-9: 0
SUM OF ALL RESPONSES TO PHQ QUESTIONS 1-9: 0

## 2025-01-08 ASSESSMENT — LIFESTYLE VARIABLES
HOW OFTEN DO YOU HAVE A DRINK CONTAINING ALCOHOL: MONTHLY OR LESS
HOW MANY STANDARD DRINKS CONTAINING ALCOHOL DO YOU HAVE ON A TYPICAL DAY: 1 OR 2

## 2025-01-08 NOTE — PROGRESS NOTES
Destin Chang Jr. (:  1950) is a 74 y.o. male,Established patient, here for evaluation of the following chief complaint(s):  Medicare AWV (Pt is here for his yearly AWV and lab review. )         Assessment & Plan  Medicare annual wellness visit, subsequent  Medicare wellness responses reviewed in the office today  AAA screen negative on 2019  Cologuard negative on 10/30/2024  PSA within normal limits on 2024  Up-to-date flu shot administered in office today         Type 2 diabetes mellitus without complication, without long-term current use of insulin ([facility])  A1c 6% on 2025  Check urine microalbumin to creatinine ratio in the future to evaluate for proteinuria  Check fasting blood sugar more regularly.  Continue metformin and lifestyle changes    Orders:    Albumin/Creatinine Ratio, Urine; Future    Coronary artery disease involving native coronary artery of native heart without angina pectoris  Self-reported MI in   Negative nuclear stress test in 2017  Echo on 2021 with mild concentric LVH, mild hypokinesis of basal inferior wall, EF of 55 to 60%, mildly dilated LA, AV sclerosis with trace AR and MR, mild TR  Prior EKG with borderline first-degree AV block with heart rate of 63 bpm, PVCs, RBBB, S wave in lead I, Q wave with T wave inversion in lead III overall similar to prior  Continue aspirin, metoprolol succinate, rosuvastatin, losartan  Follow-up per cardiology         PVCs (premature ventricular contractions)  Cardiac work-up as above  Labs from 2025 with normal TSH, free T4 and electrolytes  Continue metoprolol succinate         Essential hypertension  Continue metoprolol succinate and losartan          Dyslipidemia  Lipid panel from 2025 with elevated triglycerides of 201, LDL above goal at 80  Likely secondary to dietary indiscretions over the holidays, continue rosuvastatin         CABRERA (nonalcoholic steatohepatitis)  Rare alcohol use  Abdominal ultrasound 
MG tablet Take 1 tablet by mouth daily (with breakfast) Yes Lloyd Liu DO   losartan (COZAAR) 25 MG tablet Take 1 tablet by mouth daily Yes Lloyd Liu DO   aspirin 81 MG EC tablet Take 1 tablet by mouth every other day Yes Provider, MD Ji   fluticasone (FLONASE) 50 MCG/ACT nasal spray 2 sprays by Nasal route daily as needed for Rhinitis or Allergies Yes Michelle Foley APRN - CNP   cyanocobalamin 1000 MCG tablet Take 5 tablets by mouth daily Yes Automatic Reconciliation, Ar   magnesium oxide (MAG-OX) 400 (240 Mg) MG tablet Take 1 tablet by mouth daily Yes Automatic Reconciliation, Ar   sildenafil (VIAGRA) 100 MG tablet Take 1/2 to one whole tablet thirty to sixty minutes prior to intercourse as needed for ED Yes Automatic Reconciliation, Ar   vitamin E 400 UNIT capsule Take 1 capsule by mouth daily Yes Automatic Reconciliation, Ar       CareTeam (Including outside providers/suppliers regularly involved in providing care):   Patient Care Team:  Lloyd Liu DO as PCP - General  Lloyd Liu DO as PCP - Empaneled Provider     Recommendations for Preventive Services Due: see orders and patient instructions/AVS.  Recommended screening schedule for the next 5-10 years is provided to the patient in written form: see Patient Instructions/AVS.     Reviewed and updated this visit:  Tobacco  Allergies  Meds  Problems  Med Hx  Surg Hx  Soc Hx  Fam Hx

## 2025-02-11 ENCOUNTER — OFFICE VISIT (OUTPATIENT)
Dept: INTERNAL MEDICINE CLINIC | Facility: CLINIC | Age: 75
End: 2025-02-11

## 2025-02-11 DIAGNOSIS — R14.3 FLATULENCE: ICD-10-CM

## 2025-02-11 DIAGNOSIS — R19.7 DIARRHEA, UNSPECIFIED TYPE: ICD-10-CM

## 2025-02-11 DIAGNOSIS — K86.89 PANCREATIC INSUFFICIENCY: Primary | ICD-10-CM

## 2025-02-11 SDOH — ECONOMIC STABILITY: FOOD INSECURITY: WITHIN THE PAST 12 MONTHS, THE FOOD YOU BOUGHT JUST DIDN'T LAST AND YOU DIDN'T HAVE MONEY TO GET MORE.: NEVER TRUE

## 2025-02-11 SDOH — ECONOMIC STABILITY: FOOD INSECURITY: WITHIN THE PAST 12 MONTHS, YOU WORRIED THAT YOUR FOOD WOULD RUN OUT BEFORE YOU GOT MONEY TO BUY MORE.: NEVER TRUE

## 2025-02-11 NOTE — PROGRESS NOTES
[facility]      2025    Patient Name: Destin Chang Jr.  :  1950      Chief Complaint:  Chief Complaint   Patient presents with    Referral - General     Pt would like a referral to Gastro          HPI  Patient presents today requesting referral to GI. History of CABRERA and chronic diarrhea. Last seen by GI ([facility]) on 22. Extensive stool studies completed which were significant for decreased pancreatic elastase at 131, diagnosing pancreatic insufficiency. Pancreatic enzymes recommended but were cost prohibitive. EUS recommended to assess for structural abnormalities but was never completed.     He presents today requesting referral to GI for a second opinion. Reports having episodes of \"gas pains\" and diarrhea. These are not new symptoms but have been occurring more frequently over the past 1-2 months. Episodes are now occurring about once weekly. Symptoms last about 8-12 hours and then resolve. He reports several episodes of diarrhea during these episodes. No associated abdominal pain, constipation, N/V, blood in the stool or unintentional weight loss. He had a negative Cologuard on 10/30/24. Occasional GERD for which he is now taking OTC Nexium daily which controls his symptoms fairly well.         Past Medical History:   Diagnosis Date    Anemia     only blood transfusion ~2018    CAD (coronary artery disease)     Former smoker, stopped smoking in distant past     Quit   1 ppd x 20 yrs    GERD (gastroesophageal reflux disease)     Hiatal hernia, pepcid daily, well controlled    Heart attack (HCC)     \"pt unsure if stent was placed\" -   @ [facility]    Hiatal hernia     Hypercholesterolemia     Hypertension     managed with meds    Lactose intolerance     MI, old       @ [facility]- no stents or interventions    Morbid obesity 2019    BMI 41.14    CABRERA (nonalcoholic steatohepatitis)     ARCHANA (obstructive sleep apnea) 2017    uses CPAP    Right bundle

## 2025-02-12 ENCOUNTER — OFFICE VISIT (OUTPATIENT)
Age: 75
End: 2025-02-12
Payer: MEDICARE

## 2025-02-12 VITALS
WEIGHT: 219.6 LBS | TEMPERATURE: 97.2 F | RESPIRATION RATE: 18 BRPM | HEART RATE: 83 BPM | SYSTOLIC BLOOD PRESSURE: 140 MMHG | BODY MASS INDEX: 32.53 KG/M2 | HEIGHT: 69 IN | DIASTOLIC BLOOD PRESSURE: 90 MMHG | OXYGEN SATURATION: 97 %

## 2025-02-12 VITALS
RESPIRATION RATE: 16 BRPM | BODY MASS INDEX: 32.73 KG/M2 | HEART RATE: 58 BPM | SYSTOLIC BLOOD PRESSURE: 110 MMHG | DIASTOLIC BLOOD PRESSURE: 77 MMHG | OXYGEN SATURATION: 94 % | HEIGHT: 69 IN | WEIGHT: 221 LBS

## 2025-02-12 DIAGNOSIS — K76.0 FATTY LIVER: Primary | ICD-10-CM

## 2025-02-12 DIAGNOSIS — K86.81 EXOCRINE PANCREATIC INSUFFICIENCY: ICD-10-CM

## 2025-02-12 PROCEDURE — 1123F ACP DISCUSS/DSCN MKR DOCD: CPT | Performed by: INTERNAL MEDICINE

## 2025-02-12 PROCEDURE — 1036F TOBACCO NON-USER: CPT | Performed by: INTERNAL MEDICINE

## 2025-02-12 PROCEDURE — 3017F COLORECTAL CA SCREEN DOC REV: CPT | Performed by: INTERNAL MEDICINE

## 2025-02-12 PROCEDURE — G8427 DOCREV CUR MEDS BY ELIG CLIN: HCPCS | Performed by: INTERNAL MEDICINE

## 2025-02-12 PROCEDURE — 99204 OFFICE O/P NEW MOD 45 MIN: CPT | Performed by: INTERNAL MEDICINE

## 2025-02-12 PROCEDURE — 1159F MED LIST DOCD IN RCRD: CPT | Performed by: INTERNAL MEDICINE

## 2025-02-12 PROCEDURE — G8417 CALC BMI ABV UP PARAM F/U: HCPCS | Performed by: INTERNAL MEDICINE

## 2025-02-12 PROCEDURE — 3078F DIAST BP <80 MM HG: CPT | Performed by: INTERNAL MEDICINE

## 2025-02-12 PROCEDURE — 3074F SYST BP LT 130 MM HG: CPT | Performed by: INTERNAL MEDICINE

## 2025-02-12 ASSESSMENT — ENCOUNTER SYMPTOMS
NAUSEA: 0
VOMITING: 0
ABDOMINAL PAIN: 1
BLOOD IN STOOL: 0
CONSTIPATION: 0
COUGH: 0
SHORTNESS OF BREATH: 0
DIARRHEA: 1

## 2025-02-12 NOTE — PROGRESS NOTES
Hypertension Brother     Hypertension Brother     Cancer Sister     Drug Abuse Sister     Heart Disease Sister         thought to be secondary to drugs     Heart Disease Father         pacemaker     No Known Problems Sister     Heart Disease Brother     Heart Disease Maternal Grandfather     Heart Disease Maternal Uncle        Current Outpatient Medications   Medication Sig Dispense Refill    metoprolol succinate (TOPROL XL) 100 MG extended release tablet Take 1 tablet by mouth every evening 90 tablet 2    rosuvastatin (CRESTOR) 20 MG tablet Take 1 tablet by mouth at bedtime 90 tablet 2    metFORMIN (GLUCOPHAGE) 500 MG tablet Take 1 tablet by mouth daily (with breakfast) 90 tablet 2    losartan (COZAAR) 25 MG tablet Take 1 tablet by mouth daily 90 tablet 2    aspirin 81 MG EC tablet Take 1 tablet by mouth every other day      fluticasone (FLONASE) 50 MCG/ACT nasal spray 2 sprays by Nasal route daily as needed for Rhinitis or Allergies 16 g 2    cyanocobalamin 1000 MCG tablet Take 5 tablets by mouth daily      magnesium oxide (MAG-OX) 400 (240 Mg) MG tablet Take 1 tablet by mouth daily      sildenafil (VIAGRA) 100 MG tablet Take 1/2 to one whole tablet thirty to sixty minutes prior to intercourse as needed for ED      vitamin E 400 UNIT capsule Take 1 capsule by mouth daily       No current facility-administered medications for this visit.       Review of Systems  All other systems reviewed and are negative except as noted above.          Objective     Vitals:    02/12/25 1404   BP: 110/77   Pulse: 58   Resp: 16   SpO2: 94%       Physical Exam  Constitutional:       Appearance: Normal appearance. He is obese.   HENT:      Head: Normocephalic.      Nose: Nose normal.   Eyes:      Extraocular Movements: Extraocular movements intact.   Cardiovascular:      Rate and Rhythm: Normal rate.   Pulmonary:      Effort: Pulmonary effort is normal.   Abdominal:      Palpations: Abdomen is soft.   Musculoskeletal:

## 2025-02-12 NOTE — PATIENT INSTRUCTIONS
Start Creon 72k lipase units (2 caps) prior to meals and 36k (1 cap) prior to snacks- can provide samples and see if insurance will cover, if this doesn't seem to help with any diarrheal issues then discontinue     Trial of Omeprazole/Prilosec 20mg oral twice a day prior to meals for possible reflux     If ongoing issues with nausea and vomiting, then will plan for EGD and potentially other imaging     US abdomen to assess liver     Lab- CABRERA FibroSure     Imodium as needed    Probiotic daily     Low FODMAP diet     Benefiber daily

## 2025-02-23 NOTE — PROGRESS NOTES
Occupational History    Not on file   Tobacco Use    Smoking status: Former     Packs/day: 1.00     Years: 10.00     Additional pack years: 0.00     Total pack years: 10.00     Types: Cigarettes     Quit date: 2005     Years since quittin.9    Smokeless tobacco: Never   Vaping Use    Vaping Use: Never used   Substance and Sexual Activity    Alcohol use: Yes     Alcohol/week: 2.0 standard drinks of alcohol     Types: 2 Shots of liquor per week     Comment: not much of a drinker/ socially    Drug use: No    Sexual activity: Yes     Partners: Female   Other Topics Concern    Not on file   Social History Narrative    Not on file     Social Determinants of Health     Financial Resource Strain: Not on file   Food Insecurity: Unknown (3/22/2023)    Hunger Vital Sign     Worried About Running Out of Food in the Last Year: Patient refused     [address] in the Last Year: Patient refused   Transportation Needs: Unknown (3/22/2023)    PRAPARE - Transportation     Lack of Transportation (Medical):  Not on file     Lack of Transportation (Non-Medical): Patient refused   Physical Activity: Not on file   Stress: Not on file   Social Connections: Not on file   Intimate Partner Violence: Not on file   Housing Stability: Unknown (3/22/2023)    Housing Stability Vital Sign     Unable to Pay for Housing in the Last Year: Not on file     Number of [address] in the Last Year: Not on file     Unstable Housing in the Last Year: Patient refused         Family History   Problem Relation Age of Onset    Heart Attack Paternal Uncle     Heart Attack Paternal Grandfather 27    Stroke Mother     Alzheimer's Disease Mother     Hypertension Brother     Hypertension Brother     Cancer Sister     Drug Abuse Sister     Heart Disease Sister         thought to be secondary to drugs     Heart Disease Father         pacemaker     No Known Problems Sister     Heart Disease Brother     Heart Disease Maternal Grandfather     Heart Disease 0.11

## 2025-02-27 ENCOUNTER — TELEPHONE (OUTPATIENT)
Dept: GASTROENTEROLOGY | Age: 75
End: 2025-02-27

## 2025-02-27 NOTE — TELEPHONE ENCOUNTER
Patient requesting instructions on Creon discount again. He lost the paperwork he received in the office. Patient requesting a call back to go over options.

## 2025-04-02 NOTE — PROGRESS NOTES
Frequency of Communication with Friends and Family: Not on file     Frequency of Social Gatherings with Friends and Family: Not on file     Attends Yarsani Services: Not on file     Active Member of Clubs or Organizations: Not on file     Attends Club or Organization Meetings: Not on file     Marital Status:    Intimate Partner Violence: Not on file   Housing Stability: Low Risk  (2/11/2025)    Housing Stability Vital Sign     Unable to Pay for Housing in the Last Year: No     Number of Times Moved in the Last Year: 0     Homeless in the Last Year: No         Family History   Problem Relation Age of Onset    Heart Attack Paternal Uncle     Heart Attack Paternal Grandfather 30    Stroke Mother     Alzheimer's Disease Mother     Hypertension Brother     Hypertension Brother     Cancer Sister     Drug Abuse Sister     Heart Disease Sister         thought to be secondary to drugs     Heart Disease Father         pacemaker     No Known Problems Sister     Heart Disease Brother     Heart Disease Maternal Grandfather     Heart Disease Maternal Uncle          Allergies   Allergen Reactions    Amlodipine Palpitations         Current Outpatient Medications   Medication Sig    metoprolol succinate (TOPROL XL) 100 MG extended release tablet Take 1 tablet by mouth every evening    rosuvastatin (CRESTOR) 20 MG tablet Take 1 tablet by mouth at bedtime    metFORMIN (GLUCOPHAGE) 500 MG tablet Take 1 tablet by mouth daily (with breakfast)    losartan (COZAAR) 25 MG tablet Take 1 tablet by mouth daily    aspirin 81 MG EC tablet Take 1 tablet by mouth every other day    fluticasone (FLONASE) 50 MCG/ACT nasal spray 2 sprays by Nasal route daily as needed for Rhinitis or Allergies    cyanocobalamin 1000 MCG tablet Take 5 tablets by mouth daily    magnesium oxide (MAG-OX) 400 (240 Mg) MG tablet Take 1 tablet by mouth daily    sildenafil (VIAGRA) 100 MG tablet Take 1/2 to one whole tablet thirty to sixty minutes prior to

## 2025-04-03 ENCOUNTER — OFFICE VISIT (OUTPATIENT)
Dept: SLEEP MEDICINE | Age: 75
End: 2025-04-03
Payer: MEDICARE

## 2025-04-03 VITALS
DIASTOLIC BLOOD PRESSURE: 70 MMHG | HEIGHT: 69 IN | OXYGEN SATURATION: 97 % | HEART RATE: 60 BPM | SYSTOLIC BLOOD PRESSURE: 128 MMHG | BODY MASS INDEX: 32.44 KG/M2 | RESPIRATION RATE: 14 BRPM | WEIGHT: 219 LBS

## 2025-04-03 DIAGNOSIS — R09.02 HYPOXEMIA: ICD-10-CM

## 2025-04-03 DIAGNOSIS — G47.33 OSA ON CPAP: Primary | ICD-10-CM

## 2025-04-03 DIAGNOSIS — E66.01 SEVERE OBESITY: ICD-10-CM

## 2025-04-03 PROCEDURE — 1160F RVW MEDS BY RX/DR IN RCRD: CPT | Performed by: INTERNAL MEDICINE

## 2025-04-03 PROCEDURE — 1036F TOBACCO NON-USER: CPT | Performed by: INTERNAL MEDICINE

## 2025-04-03 PROCEDURE — 1123F ACP DISCUSS/DSCN MKR DOCD: CPT | Performed by: INTERNAL MEDICINE

## 2025-04-03 PROCEDURE — G2211 COMPLEX E/M VISIT ADD ON: HCPCS | Performed by: INTERNAL MEDICINE

## 2025-04-03 PROCEDURE — 1159F MED LIST DOCD IN RCRD: CPT | Performed by: INTERNAL MEDICINE

## 2025-04-03 PROCEDURE — 99214 OFFICE O/P EST MOD 30 MIN: CPT | Performed by: INTERNAL MEDICINE

## 2025-04-03 PROCEDURE — 3017F COLORECTAL CA SCREEN DOC REV: CPT | Performed by: INTERNAL MEDICINE

## 2025-04-03 PROCEDURE — G8427 DOCREV CUR MEDS BY ELIG CLIN: HCPCS | Performed by: INTERNAL MEDICINE

## 2025-04-03 PROCEDURE — G8417 CALC BMI ABV UP PARAM F/U: HCPCS | Performed by: INTERNAL MEDICINE

## 2025-04-03 PROCEDURE — 3078F DIAST BP <80 MM HG: CPT | Performed by: INTERNAL MEDICINE

## 2025-04-03 PROCEDURE — 3074F SYST BP LT 130 MM HG: CPT | Performed by: INTERNAL MEDICINE

## 2025-04-03 ASSESSMENT — SLEEP AND FATIGUE QUESTIONNAIRES
ESS TOTAL SCORE: 1
HOW LIKELY ARE YOU TO NOD OFF OR FALL ASLEEP WHILE SITTING INACTIVE IN A PUBLIC PLACE: WOULD NEVER DOZE
HOW LIKELY ARE YOU TO NOD OFF OR FALL ASLEEP WHILE WATCHING TV: WOULD NEVER DOZE
HOW LIKELY ARE YOU TO NOD OFF OR FALL ASLEEP WHILE SITTING AND TALKING TO SOMEONE: WOULD NEVER DOZE
HOW LIKELY ARE YOU TO NOD OFF OR FALL ASLEEP WHILE SITTING QUIETLY AFTER LUNCH WITHOUT ALCOHOL: SLIGHT CHANCE OF DOZING
HOW LIKELY ARE YOU TO NOD OFF OR FALL ASLEEP WHILE LYING DOWN TO REST IN THE AFTERNOON WHEN CIRCUMSTANCES PERMIT: WOULD NEVER DOZE
HOW LIKELY ARE YOU TO NOD OFF OR FALL ASLEEP WHILE SITTING AND READING: WOULD NEVER DOZE
HOW LIKELY ARE YOU TO NOD OFF OR FALL ASLEEP IN A CAR, WHILE STOPPED FOR A FEW MINUTES IN TRAFFIC: WOULD NEVER DOZE
HOW LIKELY ARE YOU TO NOD OFF OR FALL ASLEEP WHEN YOU ARE A PASSENGER IN A CAR FOR AN HOUR WITHOUT A BREAK: WOULD NEVER DOZE

## 2025-07-04 NOTE — PROGRESS NOTES
serum magnesium  Continue metoprolol succinate    Orders:    TSH; Future    Magnesium; Future    Essential hypertension  Continue metoprolol succinate, losartan    Orders:    CBC with Auto Differential; Future    Comprehensive Metabolic Panel; Future    Lipid Panel; Future    TSH; Future    Urinalysis; Future    losartan (COZAAR) 25 MG tablet; Take 1 tablet by mouth daily    metoprolol succinate (TOPROL XL) 100 MG extended release tablet; Take 1 tablet by mouth every evening    Dyslipidemia  Lipid panel from 1/2/2025 with elevated triglycerides of 201, LDL above goal at 80   Continue rosuvastatin    Orders:    CBC with Auto Differential; Future    Comprehensive Metabolic Panel; Future    Lipid Panel; Future    TSH; Future    rosuvastatin (CRESTOR) 20 MG tablet; Take 1 tablet by mouth at bedtime    CABRERA (nonalcoholic steatohepatitis)  Rare alcohol use  Abdominal ultrasound on 10/19/2020 with moderate diffuse hepatic steatosis  Labs from 1/2/2025 with AST elevated at 77, remaining LFTs within normal limits.  Platelet count low at 120 but stable  Continue lifestyle changes  With next labs besides CBC to assess platelet count, CMP to assess liver enzymes and albumin also check PT/INR as further assessment of synthetic capacity liver    Orders:    Comprehensive Metabolic Panel; Future    Protime-INR; Future    Thrombocytopenia  Per chart review platelet count fluctuates from the 110s to 130s dating back to 2019   Borderline concurrent anemia but normal white blood cell count  Abdominal ultrasound from 10/19/2020 with hepatic steatosis. CT abdomen and pelvis from December 2018 without splenomegaly or suspicious splenic lesion   Peripheral smear on 8/9/2024 with morphologically unremarkable platelets just decreased in number  Possibly ITP or secondary to CABRERA.  Monitor for now    Orders:    CBC with Auto Differential; Future    Comprehensive Metabolic Panel; Future    Vitamin B12; Future    Folate; Future    Normocytic

## 2025-07-05 NOTE — ASSESSMENT & PLAN NOTE
Rare alcohol use  Abdominal ultrasound on 10/19/2020 with moderate diffuse hepatic steatosis  Labs from 1/2/2025 with AST elevated at 77, remaining LFTs within normal limits.  Platelet count low at 120 but stable  Continue lifestyle changes  With next labs besides CBC to assess platelet count, CMP to assess liver enzymes and albumin also check PT/INR as further assessment of synthetic capacity liver    Orders:    Comprehensive Metabolic Panel; Future    Protime-INR; Future

## 2025-07-05 NOTE — ASSESSMENT & PLAN NOTE
Self-reported MI in 2008  Negative nuclear stress test in 2017  Echo on 11/19/2021 with mild concentric LVH, mild hypokinesis of basal inferior wall, EF of 55 to 60%, mildly dilated LA, AV sclerosis with trace AR and MR, mild TR  Prior EKG with borderline first-degree AV block with heart rate of 63 bpm, PVCs, RBBB, S wave in lead I, Q wave with T wave inversion in lead III overall similar to prior  Continue aspirin, metoprolol succinate, rosuvastatin, losartan  Follow-up per cardiology    Orders:    CBC with Auto Differential; Future    Comprehensive Metabolic Panel; Future    Lipid Panel; Future    metoprolol succinate (TOPROL XL) 100 MG extended release tablet; Take 1 tablet by mouth every evening    rosuvastatin (CRESTOR) 20 MG tablet; Take 1 tablet by mouth at bedtime

## 2025-07-05 NOTE — ASSESSMENT & PLAN NOTE
Continue metoprolol succinate, losartan    Orders:    CBC with Auto Differential; Future    Comprehensive Metabolic Panel; Future    Lipid Panel; Future    TSH; Future    Urinalysis; Future    losartan (COZAAR) 25 MG tablet; Take 1 tablet by mouth daily    metoprolol succinate (TOPROL XL) 100 MG extended release tablet; Take 1 tablet by mouth every evening

## 2025-07-10 ENCOUNTER — OFFICE VISIT (OUTPATIENT)
Dept: INTERNAL MEDICINE CLINIC | Facility: CLINIC | Age: 75
End: 2025-07-10

## 2025-07-10 VITALS
SYSTOLIC BLOOD PRESSURE: 140 MMHG | HEIGHT: 69 IN | WEIGHT: 222.4 LBS | HEART RATE: 71 BPM | TEMPERATURE: 98.4 F | RESPIRATION RATE: 16 BRPM | BODY MASS INDEX: 32.94 KG/M2 | OXYGEN SATURATION: 97 % | DIASTOLIC BLOOD PRESSURE: 82 MMHG

## 2025-07-10 DIAGNOSIS — K86.89 PANCREATIC INSUFFICIENCY: ICD-10-CM

## 2025-07-10 DIAGNOSIS — E78.5 DYSLIPIDEMIA: ICD-10-CM

## 2025-07-10 DIAGNOSIS — E11.9 TYPE 2 DIABETES MELLITUS WITHOUT COMPLICATION, WITHOUT LONG-TERM CURRENT USE OF INSULIN (HCC): Primary | ICD-10-CM

## 2025-07-10 DIAGNOSIS — K75.81 NASH (NONALCOHOLIC STEATOHEPATITIS): ICD-10-CM

## 2025-07-10 DIAGNOSIS — Z12.5 PROSTATE CANCER SCREENING: ICD-10-CM

## 2025-07-10 DIAGNOSIS — G47.33 OBSTRUCTIVE SLEEP APNEA: ICD-10-CM

## 2025-07-10 DIAGNOSIS — I10 ESSENTIAL HYPERTENSION: ICD-10-CM

## 2025-07-10 DIAGNOSIS — D64.9 NORMOCYTIC ANEMIA: ICD-10-CM

## 2025-07-10 DIAGNOSIS — D69.6 THROMBOCYTOPENIA: ICD-10-CM

## 2025-07-10 DIAGNOSIS — I49.3 PVCS (PREMATURE VENTRICULAR CONTRACTIONS): ICD-10-CM

## 2025-07-10 DIAGNOSIS — I25.10 CORONARY ARTERY DISEASE INVOLVING NATIVE CORONARY ARTERY OF NATIVE HEART WITHOUT ANGINA PECTORIS: ICD-10-CM

## 2025-07-10 LAB — HBA1C MFR BLD: 6 %

## 2025-07-10 RX ORDER — METOPROLOL SUCCINATE 100 MG/1
100 TABLET, EXTENDED RELEASE ORAL EVERY EVENING
Qty: 90 TABLET | Refills: 2 | Status: SHIPPED | OUTPATIENT
Start: 2025-07-10

## 2025-07-10 RX ORDER — ROSUVASTATIN CALCIUM 20 MG/1
20 TABLET, COATED ORAL NIGHTLY
Qty: 90 TABLET | Refills: 2 | Status: SHIPPED | OUTPATIENT
Start: 2025-07-10

## 2025-07-10 RX ORDER — LOSARTAN POTASSIUM 25 MG/1
25 TABLET ORAL DAILY
Qty: 90 TABLET | Refills: 2 | Status: SHIPPED | OUTPATIENT
Start: 2025-07-10

## 2025-07-10 ASSESSMENT — ENCOUNTER SYMPTOMS
SHORTNESS OF BREATH: 0
ANAL BLEEDING: 1
ABDOMINAL PAIN: 0
CONSTIPATION: 1

## 2025-07-29 ENCOUNTER — CARE COORDINATION (OUTPATIENT)
Dept: CARE COORDINATION | Age: 75
End: 2025-07-29

## 2025-07-30 NOTE — CARE COORDINATION
SECURE email notification sent to identified IDT members at   [facility]

## 2025-08-15 ENCOUNTER — TELEPHONE (OUTPATIENT)
Dept: INTERNAL MEDICINE CLINIC | Facility: CLINIC | Age: 75
End: 2025-08-15

## (undated) DEVICE — Device: Brand: BALLOON3

## (undated) DEVICE — UNIVERSAL FIXATION CANNULA: Brand: VERSAONE

## (undated) DEVICE — CONTAINER SPEC FRMLN 120ML --

## (undated) DEVICE — BLADE ASSEMB CLP HAIR FINE --

## (undated) DEVICE — LOGICUT SCISSOR LENGTH 320MM: Brand: LOGI - LAPAROSCOPIC INSTRUMENT SYSTEM

## (undated) DEVICE — SUTURE VCRL SZ 4-0 L27IN ABSRB UD L19MM PS-2 3/8 CIR PRIM J426H

## (undated) DEVICE — GAUZE,SPONGE,4"X4",12PLY,WOVEN,NS,LF: Brand: MEDLINE

## (undated) DEVICE — STANDARD HYPODERMIC NEEDLE,POLYPROPYLENE HUB: Brand: MONOJECT

## (undated) DEVICE — SYRINGE MED 3ML CLR PLAS STD N CTRL LUERLOCK TIP DISP

## (undated) DEVICE — BLADELESS OPTICAL TROCAR WITH FIXATION CANNULA: Brand: VERSAPORT

## (undated) DEVICE — SUTURE VCRL SZ 3-0 L27IN ABSRB UD L26MM SH 1/2 CIR J416H

## (undated) DEVICE — SYRINGE, LUER SLIP, STERILE, 60ML: Brand: MEDLINE

## (undated) DEVICE — DRAIN SURG L49IN DIA1/4IN 10IN H SIL W/O TRCR END PERF

## (undated) DEVICE — BLADELESS OPTICAL TROCAR WITH FIXATION CANNULA: Brand: VERSAONE

## (undated) DEVICE — BLUNT TROCAR WITH THREADED ANCHOR: Brand: VERSAONE

## (undated) DEVICE — KENDALL SCD EXPRESS SLEEVES, KNEE LENGTH, MEDIUM: Brand: KENDALL SCD

## (undated) DEVICE — UNIVERSAL STAPLER: Brand: ENDO GIA ULTRA

## (undated) DEVICE — 2000CC GUARDIAN II: Brand: GUARDIAN

## (undated) DEVICE — ARTICULATION RELOAD WITH TRI-STAPLE TECHNOLOGY: Brand: ENDO GIA

## (undated) DEVICE — LUBE JELLY FOIL PACK 1.4 OZ: Brand: MEDLINE INDUSTRIES, INC.

## (undated) DEVICE — NEEDLE HYPO 21GA L1.5IN INTRAMUSCULAR S STL LATCH BVL UP

## (undated) DEVICE — CONNECTOR TBNG OD5-7MM O2 END DISP

## (undated) DEVICE — SYRINGE MED 10ML LUERLOCK TIP W/O SFTY DISP

## (undated) DEVICE — (D)PREP SKN CHLRAPRP APPL 26ML -- CONVERT TO ITEM 371833

## (undated) DEVICE — BUTTON SWITCH PENCIL BLADE ELECTRODE, HOLSTER: Brand: EDGE

## (undated) DEVICE — SURGICAL PROCEDURE PACK BASIC ST FRANCIS

## (undated) DEVICE — (D)STRIP SKN CLSR 0.5X4IN WHT --

## (undated) DEVICE — KENDALL SCD EXPRESS SLEEVES, KNEE LENGTH, LARGE: Brand: KENDALL SCD

## (undated) DEVICE — SYR 10ML LUER LOK 1/5ML GRAD --

## (undated) DEVICE — GAUZE SPONGES,8 PLY: Brand: CURITY

## (undated) DEVICE — AMD ANTIMICROBIAL GAUZE SPONGES 8 PLY USP TYPE VII: Brand: CURITY

## (undated) DEVICE — SLIM BODY SKIN STAPLER: Brand: APPOSE ULC

## (undated) DEVICE — 2, DISPOSABLE SUCTION/IRRIGATOR WITHOUT DISPOSABLE TIP: Brand: STRYKEFLOW

## (undated) DEVICE — CANNULA NSL ORAL AD FOR CAPNOFLEX CO2 O2 AIRLFE

## (undated) DEVICE — Device

## (undated) DEVICE — BALLOON US E LIN RNG O KT FOR FG-32UA

## (undated) DEVICE — NEEDLE SYR 18GA L1.5IN RED PLAS HUB S STL BLNT FILL W/O

## (undated) DEVICE — SOLUTION IV 1000ML 0.9% SOD CHL

## (undated) DEVICE — BLOCK BITE AD 60FR W/ VELC STRP ADDRESSES MOST PT AND

## (undated) DEVICE — TRAY CATH 16F DRN BG LTX -- CONVERT TO ITEM 363158

## (undated) DEVICE — AIRLIFE™ OXYGEN TUBING 7 FEET (2.1 M) CRUSH RESISTANT OXYGEN TUBING, VINYL TIPPED: Brand: AIRLIFE™

## (undated) DEVICE — APPLIER CLP AUTO MED 9.75 IN TI SURGCLP SUPER INTLOK 20 DISP

## (undated) DEVICE — REM POLYHESIVE ADULT PATIENT RETURN ELECTRODE: Brand: VALLEYLAB

## (undated) DEVICE — SHEARS ENDOSCP L36CM DIA5MM ULTRASONIC CRV TIP W/ ADV

## (undated) DEVICE — LAP CHOLE: Brand: MEDLINE INDUSTRIES, INC.

## (undated) DEVICE — SINGLE PORT MANIFOLD: Brand: NEPTUNE 2

## (undated) DEVICE — APPLICATOR FBR TIP L6IN COT TIP WOOD SHFT SWAB 2000 PER CA

## (undated) DEVICE — SUTURE VCRL SZ 3-0 L18IN ABSRB UD W/O NDL POLYGLACTIN 910 J110T

## (undated) DEVICE — ELECTRODE NDL 2.8IN COAT VALLEYLAB

## (undated) DEVICE — SOL ANTI-FOG 6ML MEDC -- MEDICHOICE - CONVERT TO 358427

## (undated) DEVICE — MOUTHPIECE ENDOSCP L CTRL OPN AND SIDE PORTS DISP

## (undated) DEVICE — MEDI-VAC YANKAUER SUCTION HANDLE W/BULBOUS TIP: Brand: CARDINAL HEALTH

## (undated) DEVICE — 3M™ TEGADERM™ TRANSPARENT FILM DRESSING FRAME STYLE, 1624W, 2-3/8 IN X 2-3/4 IN (6 CM X 7 CM), 100/CT 4CT/CASE: Brand: 3M™ TEGADERM™

## (undated) DEVICE — SUT ETHLN 4-0 18IN PS2 BLK --

## (undated) DEVICE — MASTISOL ADHESIVE LIQ 2/3ML

## (undated) DEVICE — AMD ANTIMICROBIAL GAUZE SPONGES,12 PLY USP TYPE VII, 0.2% POLYHEXAMETHYLENE BIGUANIDE HCI (PHMB): Brand: CURITY

## (undated) DEVICE — SHEET, DRAPE, SPLIT, STERILE: Brand: MEDLINE

## (undated) DEVICE — AMD ANTIMICROBIAL NON-ADHERENT PAD,0.2% POLYHEXAMETHYLENE BIGUANIDE HCI (PHMB): Brand: TELFA

## (undated) DEVICE — DRAPE,TOP,102X53,STERILE: Brand: MEDLINE

## (undated) DEVICE — NEEDLE HYPO 25GA L1.5IN BLU POLYPR HUB S STL REG BVL STR

## (undated) DEVICE — KENDALL RADIOLUCENT FOAM MONITORING ELECTRODE RECTANGULAR SHAPE: Brand: KENDALL

## (undated) DEVICE — SOLUTION IRRIG 3000ML 0.9% SOD CHL FLX CONT 0797208] ICU MEDICAL INC]

## (undated) DEVICE — SUTURE SZ 0 27IN 5/8 CIR UR-6  TAPER PT VIOLET ABSRB VICRYL J603H

## (undated) DEVICE — YANKAUER,BULB TIP,W/O VENT,RIGID,STERILE: Brand: MEDLINE

## (undated) DEVICE — [HIGH FLOW INSUFFLATOR,  DO NOT USE IF PACKAGE IS DAMAGED,  KEEP DRY,  KEEP AWAY FROM SUNLIGHT,  PROTECT FROM HEAT AND RADIOACTIVE SOURCES.]: Brand: PNEUMOSURE